# Patient Record
Sex: FEMALE | Race: WHITE | Employment: OTHER | URBAN - NONMETROPOLITAN AREA
[De-identification: names, ages, dates, MRNs, and addresses within clinical notes are randomized per-mention and may not be internally consistent; named-entity substitution may affect disease eponyms.]

---

## 2017-01-03 ENCOUNTER — PRENATAL OFFICE VISIT (OUTPATIENT)
Dept: OBGYN | Facility: OTHER | Age: 27
End: 2017-01-03
Attending: OBSTETRICS & GYNECOLOGY
Payer: COMMERCIAL

## 2017-01-03 VITALS
DIASTOLIC BLOOD PRESSURE: 71 MMHG | SYSTOLIC BLOOD PRESSURE: 106 MMHG | OXYGEN SATURATION: 98 % | HEIGHT: 64 IN | WEIGHT: 139 LBS | BODY MASS INDEX: 23.73 KG/M2 | HEART RATE: 78 BPM

## 2017-01-03 DIAGNOSIS — Z34.82 ENCOUNTER FOR SUPERVISION OF OTHER NORMAL PREGNANCY, SECOND TRIMESTER: ICD-10-CM

## 2017-01-03 DIAGNOSIS — Z34.02 SUPERVISION OF NORMAL FIRST PREGNANCY IN SECOND TRIMESTER: ICD-10-CM

## 2017-01-03 DIAGNOSIS — R00.2 PALPITATIONS: Primary | ICD-10-CM

## 2017-01-03 DIAGNOSIS — N89.8 VAGINAL DISCHARGE: ICD-10-CM

## 2017-01-03 LAB
ANION GAP SERPL CALCULATED.3IONS-SCNC: 6 MMOL/L (ref 3–14)
BUN SERPL-MCNC: 5 MG/DL (ref 7–30)
CALCIUM SERPL-MCNC: 7.9 MG/DL (ref 8.5–10.1)
CHLORIDE SERPL-SCNC: 106 MMOL/L (ref 94–109)
CO2 SERPL-SCNC: 27 MMOL/L (ref 20–32)
CREAT SERPL-MCNC: 0.47 MG/DL (ref 0.52–1.04)
ERYTHROCYTE [DISTWIDTH] IN BLOOD BY AUTOMATED COUNT: 13.6 % (ref 10–15)
GFR SERPL CREATININE-BSD FRML MDRD: ABNORMAL ML/MIN/1.7M2
GLUCOSE 1H P 50 G GLC PO SERPL-MCNC: 134 MG/DL (ref 60–129)
GLUCOSE SERPL-MCNC: 133 MG/DL (ref 70–99)
HCT VFR BLD AUTO: 32 % (ref 35–47)
HGB BLD-MCNC: 10.8 G/DL (ref 11.7–15.7)
MAGNESIUM SERPL-MCNC: 1.8 MG/DL (ref 1.6–2.3)
MCH RBC QN AUTO: 29.6 PG (ref 26.5–33)
MCHC RBC AUTO-ENTMCNC: 33.8 G/DL (ref 31.5–36.5)
MCV RBC AUTO: 88 FL (ref 78–100)
MICRO REPORT STATUS: ABNORMAL
PLATELET # BLD AUTO: 297 10E9/L (ref 150–450)
POTASSIUM SERPL-SCNC: 3.1 MMOL/L (ref 3.4–5.3)
RBC # BLD AUTO: 3.65 10E12/L (ref 3.8–5.2)
SODIUM SERPL-SCNC: 139 MMOL/L (ref 133–144)
SPECIMEN SOURCE: ABNORMAL
WBC # BLD AUTO: 8.3 10E9/L (ref 4–11)
WET PREP SPEC: ABNORMAL

## 2017-01-03 PROCEDURE — 85027 COMPLETE CBC AUTOMATED: CPT | Performed by: OBSTETRICS & GYNECOLOGY

## 2017-01-03 PROCEDURE — 36415 COLL VENOUS BLD VENIPUNCTURE: CPT | Performed by: OBSTETRICS & GYNECOLOGY

## 2017-01-03 PROCEDURE — 83735 ASSAY OF MAGNESIUM: CPT | Performed by: OBSTETRICS & GYNECOLOGY

## 2017-01-03 PROCEDURE — 87210 SMEAR WET MOUNT SALINE/INK: CPT | Performed by: OBSTETRICS & GYNECOLOGY

## 2017-01-03 PROCEDURE — 82950 GLUCOSE TEST: CPT | Performed by: OBSTETRICS & GYNECOLOGY

## 2017-01-03 PROCEDURE — 86850 RBC ANTIBODY SCREEN: CPT | Performed by: OBSTETRICS & GYNECOLOGY

## 2017-01-03 PROCEDURE — 99207 ZZC COMPLICATED OB VISIT: CPT | Performed by: OBSTETRICS & GYNECOLOGY

## 2017-01-03 PROCEDURE — 80048 BASIC METABOLIC PNL TOTAL CA: CPT | Performed by: OBSTETRICS & GYNECOLOGY

## 2017-01-03 RX ORDER — FERROUS SULFATE 325(65) MG
325 TABLET ORAL
COMMUNITY
End: 2017-03-07

## 2017-01-03 ASSESSMENT — PAIN SCALES - GENERAL: PAINLEVEL: NO PAIN (0)

## 2017-01-03 NOTE — MR AVS SNAPSHOT
"              After Visit Summary   1/3/2017    Shy Catherine    MRN: [id number]           Patient Information     Date Of Birth          1990        Visit Information        Provider Department      1/3/2017 2:30 PM Thiago Downey MD [facility]        Today's Diagnoses     Palpitations    -  1     Vaginal discharge         Supervision of normal first pregnancy in second trimester           Care Instructions    F/u 2 wks        Follow-ups after your visit        Your next 10 appointments already scheduled     Jan 09, 2017 12:30 PM   Radiology with [facility] TREAD[facility]   [facility] ([facility] )    [address] [address] [phone]            Jan 17, 2017  2:45 PM   (Arrive by 2:30 PM)   ESTABLISHED PRENATAL with Thiago Downey MD   [facility] ([facility])    [address] [place] [id number] [phone]              Who to contact     If you have questions or need follow up information about today's clinic visit or your schedule please contact [facility] directly at [phone].  Normal or non-critical lab and imaging results will be communicated to you by Innofide[employer]art, letter or phone within 4 business days after the clinic has received the results. If you do not hear from us within 7 days, please contact the clinic through Innofideihart or phone. If you have a critical or abnormal lab result, we will notify you by phone as soon as possible.  Submit refill requests through [employer] or call your pharmacy and they will forward the refill request to us. Please allow 3 business days for your refill to be completed.          Additional Information About Your Visit        Innofideihart Information     [employer] lets you send messages to your doctor, view your test results, renew your prescriptions, schedule appointments and more. To sign up, go to [email or web address] . Click on \"Log in\" on the left side of the screen, which will take " "you to the Welcome page. Then click on \"Sign up Now\" on the right side of the page.     You will be asked to enter the access code listed below, as well as some personal information. Please follow the directions to create your username and password.     Your access code is: [email or web address]  Expires: 2017  1:34 PM     Your access code will  in 90 days. If you need help or a new code, please call your [place] clinic or [phone].        Care EveryWhere ID     This is your Care EveryWhere ID. This could be used by other organizations to access your [place] medical records  [id number]        Your Vitals Were     Pulse Height BMI (Body Mass Index) Pulse Oximetry Last Period       78 5' 4\" (1.626 m) 23.85 kg/m2 98% 2016 (Exact Date)        Blood Pressure from Last 3 Encounters:   17 106/71   16 109/70   16 102/58    Weight from Last 3 Encounters:   17 139 lb (63.05 kg)   16 135 lb (61.236 kg)   16 127 lb (57.607 kg)              We Performed the Following     Basic metabolic panel  (Ca, Cl, CO2, Creat, Gluc, K, Na, BUN)     Holter Monitor 48 hour - Adult     Magnesium     Wet prep        Primary Care Provider Office Phone # Fax #    Itzel Ayala [phone]       [address] [address]        Thank you!     Thank you for choosing [facility]  for your care. Our goal is always to provide you with excellent care. Hearing back from our patients is one way we can continue to improve our services. Please take a few minutes to complete the written survey that you may receive in the mail after your visit with us. Thank you!             Your Updated Medication List - Protect others around you: Learn how to safely use, store and throw away your medicines at www.disposemymeds.org.          This list is accurate as of: 1/3/17 11:59 PM.  Always use your most recent med list.                   Brand Name Dispense " Instructions for use    butalbital-acetaminophen-caffeine -40 MG per tablet    FIORICET/ESGIC    28 tablet    Take 1 tablet by mouth every 4 hours as needed       docusate sodium 100 MG tablet    COLACE    60 tablet    Take 100 mg by mouth 2 times daily       ferrous sulfate 325 (65 FE) MG tablet    IRON     Take 325 mg by mouth daily (with breakfast)       POTASSIMIN PO      Take 10 mEq by mouth daily       PRENATAL VITAMINS PO      Take 1 tablet by mouth daily

## 2017-01-03 NOTE — NURSING NOTE
"Chief Complaint   Patient presents with     Prenatal Care     28 weeks       Initial /71 mmHg  Pulse 78  Ht 5' 4\" (1.626 m)  Wt 139 lb (63.05 kg)  BMI 23.85 kg/m2  SpO2 98%  LMP 06/21/2016 (Exact Date) Estimated body mass index is 23.85 kg/(m^2) as calculated from the following:    Height as of this encounter: 5' 4\" (1.626 m).    Weight as of this encounter: 139 lb (63.05 kg).  BP completed using cuff size: amirah Alegria      "

## 2017-01-04 LAB — BLD GP AB SCN SERPL QL: NORMAL

## 2017-01-05 ENCOUNTER — TELEPHONE (OUTPATIENT)
Dept: OBGYN | Facility: OTHER | Age: 27
End: 2017-01-05

## 2017-01-05 NOTE — PROGRESS NOTES
Increased palpitations.  Denies CP/SOB.  .  + Vag DC, irritation/itching.  Chest CTC, RRR   Wet prep done  1 hour GTT done today along with other necessary labs/electrolytes.   Cards eval.  Pt to f/u with cardiologist.  Consider Metoprolol.  Holter monitor ordered.  .  Prenatal flowsheet information is reviewed.  Discussed kick counts and fetal movement.  RTC in 2 weeks    Denies PTL scheryl, jimbo, losara Downey MD  1/5/2017

## 2017-01-05 NOTE — TELEPHONE ENCOUNTER
Pt states she took potassium at 1100 am and by 100 pm she was losing consciousness in and out, having palpitations and chest cramping also was sweating and drooling. Her parents picked her up and took her to home where she slept. States she is still having palpitations and chest cramping. Thinks she had an reaction to potassium. Please advise

## 2017-01-09 ENCOUNTER — HOSPITAL ENCOUNTER (OUTPATIENT)
Dept: NUCLEAR MEDICINE | Facility: HOSPITAL | Age: 27
Discharge: HOME | End: 2017-01-09
Attending: OBSTETRICS & GYNECOLOGY | Admitting: OBSTETRICS & GYNECOLOGY
Payer: COMMERCIAL

## 2017-01-09 PROCEDURE — 93225 XTRNL ECG REC<48 HRS REC: CPT | Mod: TC

## 2017-01-09 PROCEDURE — 93227 XTRNL ECG REC<48 HR R&I: CPT | Performed by: INTERNAL MEDICINE

## 2017-01-09 PROCEDURE — 93226 XTRNL ECG REC<48 HR SCAN A/R: CPT | Mod: TC

## 2017-01-17 ENCOUNTER — PRENATAL OFFICE VISIT (OUTPATIENT)
Dept: OBGYN | Facility: OTHER | Age: 27
End: 2017-01-17
Attending: OBSTETRICS & GYNECOLOGY
Payer: COMMERCIAL

## 2017-01-17 VITALS
SYSTOLIC BLOOD PRESSURE: 104 MMHG | HEART RATE: 85 BPM | DIASTOLIC BLOOD PRESSURE: 68 MMHG | WEIGHT: 144 LBS | OXYGEN SATURATION: 96 % | BODY MASS INDEX: 24.59 KG/M2 | HEIGHT: 64 IN

## 2017-01-17 DIAGNOSIS — R00.2 PALPITATIONS: ICD-10-CM

## 2017-01-17 DIAGNOSIS — E87.6 HYPOKALEMIA: ICD-10-CM

## 2017-01-17 DIAGNOSIS — Z34.80 SUPERVISION OF OTHER NORMAL PREGNANCY, ANTEPARTUM: ICD-10-CM

## 2017-01-17 DIAGNOSIS — D50.8 OTHER IRON DEFICIENCY ANEMIA: Primary | ICD-10-CM

## 2017-01-17 LAB
ERYTHROCYTE [DISTWIDTH] IN BLOOD BY AUTOMATED COUNT: 14.1 % (ref 10–15)
HCT VFR BLD AUTO: 34.2 % (ref 35–47)
HGB BLD-MCNC: 11.4 G/DL (ref 11.7–15.7)
MCH RBC QN AUTO: 29.3 PG (ref 26.5–33)
MCHC RBC AUTO-ENTMCNC: 33.3 G/DL (ref 31.5–36.5)
MCV RBC AUTO: 88 FL (ref 78–100)
PLATELET # BLD AUTO: 274 10E9/L (ref 150–450)
POTASSIUM SERPL-SCNC: 3.7 MMOL/L (ref 3.4–5.3)
RBC # BLD AUTO: 3.89 10E12/L (ref 3.8–5.2)
WBC # BLD AUTO: 8.2 10E9/L (ref 4–11)

## 2017-01-17 PROCEDURE — 99207 ZZC PRENATAL VISIT: CPT | Performed by: OBSTETRICS & GYNECOLOGY

## 2017-01-17 PROCEDURE — 99000 SPECIMEN HANDLING OFFICE-LAB: CPT | Performed by: OBSTETRICS & GYNECOLOGY

## 2017-01-17 PROCEDURE — 84132 ASSAY OF SERUM POTASSIUM: CPT | Performed by: OBSTETRICS & GYNECOLOGY

## 2017-01-17 PROCEDURE — 85027 COMPLETE CBC AUTOMATED: CPT | Performed by: OBSTETRICS & GYNECOLOGY

## 2017-01-17 PROCEDURE — 82330 ASSAY OF CALCIUM: CPT | Mod: 90 | Performed by: OBSTETRICS & GYNECOLOGY

## 2017-01-17 PROCEDURE — 36415 COLL VENOUS BLD VENIPUNCTURE: CPT | Performed by: OBSTETRICS & GYNECOLOGY

## 2017-01-17 ASSESSMENT — PAIN SCALES - GENERAL: PAINLEVEL: NO PAIN (0)

## 2017-01-17 NOTE — NURSING NOTE
"Chief Complaint   Patient presents with     Prenatal Care     30 weeks       Initial /68 mmHg  Pulse 85  Ht 5' 4\" (1.626 m)  Wt 144 lb (65.318 kg)  BMI 24.71 kg/m2  SpO2 96%  LMP 06/21/2016 (Exact Date) Estimated body mass index is 24.71 kg/(m^2) as calculated from the following:    Height as of this encounter: 5' 4\" (1.626 m).    Weight as of this encounter: 144 lb (65.318 kg).  BP completed using cuff size: amirah Alegria      "

## 2017-01-17 NOTE — MR AVS SNAPSHOT
"              After Visit Summary   1/17/2017    Shy Catherine    MRN: [id number]           Patient Information     Date Of Birth          1990        Visit Information        Provider Department      1/17/2017 2:45 PM Thiago Downey MD [facility]        Today's Diagnoses     Anemia    -  1     Palpitations         Hypokalemia         Supervision of other normal pregnancy, antepartum           Care Instructions    RTC in 2 weeks        Follow-ups after your visit        Your next 10 appointments already scheduled     Jan 31, 2017  2:30 PM   (Arrive by 2:15 PM)   ESTABLISHED PRENATAL with Thigao Downey MD   [facility] ([facility])    [address] [address]   [phone]              Who to contact     If you have questions or need follow up information about today's clinic visit or your schedule [facility] at [phone].  Normal or non-critical lab and imaging results will be communicated to you by MyChart, letter or phone within 4 business days after the clinic has received the results. If you do not hear from us within 7 days, please contact the clinic through MyChart or phone. If you have a critical or abnormal lab result, we will notify you by phone as soon as possible.  Submit refill requests through [employer] or call your pharmacy and they will forward the refill request to us. Please allow 3 business days for your refill to be completed.          Additional Information About Your Visit        MyChart Information     [employer] lets you send messages to your doctor, view your test results, renew your prescriptions, schedule appointments and more. To sign up, go to [email or web address] . Click on \"Log in\" on the left side of the screen, which will take you to the Welcome page. Then click on \"Sign up Now\" on the right side of the page.     You will be asked to enter the access code listed below, as well as some " "personal information. Please follow the directions to create your username and password.     Your access code is: [email or web address]  Expires: 2017  1:34 PM     Your access code will  in 90 days. If you need help or a new code, please call your [place] clinic or [phone].        Care EveryWhere ID     This is your Care EveryWhere ID. This could be used by other organizations to access your [place] medical records  [id number]        Your Vitals Were     Pulse Height BMI (Body Mass Index) Pulse Oximetry Last Period       85 5' 4\" (1.626 m) 24.71 kg/m2 96% 2016 (Exact Date)        Blood Pressure from Last 3 Encounters:   17 104/68   17 106/71   16 109/70    Weight from Last 3 Encounters:   17 144 lb (65.318 kg)   17 139 lb (63.05 kg)   16 135 lb (61.236 kg)              We Performed the Following     Calcium ionized     CBC with platelets     Potassium        Primary Care Provider Office Phone # Fax #    Itzel VizcarraMAYA estrada [phone]       [address] [address]        Thank you!     Thank you for choosing [facility]  for your care. Our goal is always to provide you with excellent care. Hearing back from our patients is one way we can continue to improve our services. Please take a few minutes to complete the written survey that you may receive in the mail after your visit with us. Thank you!             Your Updated Medication List - Protect others around you: Learn how to safely use, store and throw away your medicines at www.disposemymeds.org.          This list is accurate as of: 17  3:26 PM.  Always use your most recent med list.                   Brand Name Dispense Instructions for use    butalbital-acetaminophen-caffeine -40 MG per tablet    FIORICET/ESGIC    28 tablet    Take 1 tablet by mouth every 4 hours as needed       docusate sodium 100 MG tablet    COLACE    60 tablet    Take 100 mg by " mouth 2 times daily       ferrous sulfate 325 (65 FE) MG tablet    IRON     Take 325 mg by mouth daily (with breakfast)       METAMUCIL PO      Take 1 tablet by mouth daily       PRENATAL VITAMINS PO      Take 1 tablet by mouth daily

## 2017-01-17 NOTE — PROGRESS NOTES
Holter monitor reviewed.  PAC/PV's.  Cardiology f/u scheduled 1/30  Had reaction to potassium so eating potassium rich foods.  On iron.  Doing well.  No concerns today.  Occ BX 2/hr.  PTL precautions discussed.   RTC in 2 weeks  Denies PTL lori, jimbo, ashley Downey MD  1/17/2017

## 2017-01-18 LAB — CA-I SERPL ISE-MCNC: 4.8 MG/DL (ref 4.4–5.2)

## 2017-01-19 ENCOUNTER — TRANSFERRED RECORDS (OUTPATIENT)
Dept: HEALTH INFORMATION MANAGEMENT | Facility: HOSPITAL | Age: 27
End: 2017-01-19

## 2017-01-30 ENCOUNTER — HOSPITAL ENCOUNTER (OUTPATIENT)
Facility: HOSPITAL | Age: 27
Discharge: HOME | End: 2017-01-30
Attending: OBSTETRICS & GYNECOLOGY | Admitting: OBSTETRICS & GYNECOLOGY
Payer: COMMERCIAL

## 2017-01-30 ENCOUNTER — TELEPHONE (OUTPATIENT)
Dept: OBGYN | Facility: HOSPITAL | Age: 27
End: 2017-01-30

## 2017-01-30 VITALS
OXYGEN SATURATION: 97 % | RESPIRATION RATE: 16 BRPM | HEART RATE: 85 BPM | DIASTOLIC BLOOD PRESSURE: 66 MMHG | SYSTOLIC BLOOD PRESSURE: 104 MMHG

## 2017-01-30 PROBLEM — Z36.89 ENCOUNTER FOR TRIAGE IN PREGNANT PATIENT: Status: ACTIVE | Noted: 2017-01-30

## 2017-01-30 LAB
ALBUMIN UR-MCNC: NEGATIVE MG/DL
APPEARANCE UR: CLEAR
BILIRUB UR QL STRIP: NEGATIVE
COLOR UR AUTO: ABNORMAL
FIBRONECTIN FETAL VAG QL: NORMAL
GLUCOSE UR STRIP-MCNC: NEGATIVE MG/DL
HGB UR QL STRIP: NEGATIVE
KETONES UR STRIP-MCNC: NEGATIVE MG/DL
LEUKOCYTE ESTERASE UR QL STRIP: ABNORMAL
MUCOUS THREADS #/AREA URNS LPF: PRESENT /LPF
NITRATE UR QL: NEGATIVE
PH UR STRIP: 6 PH (ref 4.7–8)
RBC #/AREA URNS AUTO: 3 /HPF (ref 0–2)
SP GR UR STRIP: 1 (ref 1–1.03)
SQUAMOUS #/AREA URNS AUTO: 1 /HPF (ref 0–1)
URN SPEC COLLECT METH UR: ABNORMAL
UROBILINOGEN UR STRIP-MCNC: NORMAL MG/DL (ref 0–2)
WBC #/AREA URNS AUTO: 1 /HPF (ref 0–2)

## 2017-01-30 PROCEDURE — 99213 OFFICE O/P EST LOW 20 MIN: CPT | Mod: 25 | Performed by: OBSTETRICS & GYNECOLOGY

## 2017-01-30 PROCEDURE — 81001 URINALYSIS AUTO W/SCOPE: CPT | Performed by: OBSTETRICS & GYNECOLOGY

## 2017-01-30 PROCEDURE — 59025 FETAL NON-STRESS TEST: CPT | Mod: 26 | Performed by: OBSTETRICS & GYNECOLOGY

## 2017-01-30 PROCEDURE — 99214 OFFICE O/P EST MOD 30 MIN: CPT | Mod: 25

## 2017-01-30 PROCEDURE — 87086 URINE CULTURE/COLONY COUNT: CPT | Performed by: OBSTETRICS & GYNECOLOGY

## 2017-01-30 PROCEDURE — 82731 ASSAY OF FETAL FIBRONECTIN: CPT | Performed by: OBSTETRICS & GYNECOLOGY

## 2017-01-30 PROCEDURE — 59025 FETAL NON-STRESS TEST: CPT

## 2017-01-30 NOTE — PLAN OF CARE
OB Triage Note  Shy Catherine  MRN: [id number]  Gestational Age: 31w6d      Shy Catherine presents for decreases fetal movement , vaginal discharge ,thick mucous, and had red bleeding this am (sign/symptom/concern).      States kaylee since this am .  Rates pain at 4/10.  Bleeding: spotting  began at 8:00.  Denies LOF.      MDnotified of arrival and condition.  Oriented patient to surroundings. Call light within reach.     FHT:140  NST Start Time: 1100  NST Stop Time:  1120  NST: Reactive   Uterine Assessment:   no UC's palpated or noted on strip    Plan:  -Initial NST, then fetal/uterine monitoring per MD/patient plan.  -Sterile vaginal exam/sterile speculum exam.  -Nursing education on  labor provided.  UA normal and FFN is pending

## 2017-01-30 NOTE — PLAN OF CARE
Dr Adair in with patient , VE thick , fingertip and soft cervix , discussed  labor , plan to see what FFN results are and then discharge

## 2017-01-30 NOTE — IP AVS SNAPSHOT
MRN:[id number]                      After Visit Summary   1/30/2017    Shy Catherine    MRN: [id number]           Thank you!     Thank you for choosing [facility] for your care. Our goal is always to provide you with excellent care. Hearing back from our patients is one way we can continue to improve our services. Please take a few minutes to complete the written survey that you may receive in the mail after you visit with us. Thank you!        Patient Information     Date Of Birth          1990        About your hospital stay     You were admitted on:  January 30, 2017 You last received care in the:  [facility]    You were discharged on:  January 30, 2017       Who to Call     For medical emergencies, please call 911.  For non-urgent questions about your medical care, please call your primary care provider or clinic, [phone]          Attending Provider     Provider    Aida Adair MD       Primary Care Provider Office Phone # Fax #    Itzel AyalaMAYA [phone]       [address] [address]        Your next 10 appointments already scheduled     Jan 31, 2017  2:30 PM   (Arrive by 2:15 PM)   ESTABLISHED PRENATAL with Thiago Downey MD   [facility] ([facility])    [address] [place] [id number] [phone]              Further instructions from your care team       Discharge Instructions for Undelivered Patients    Diet:  * Drink 8 to 12 glasses of liquids (milk, juice, water) every day  * You may eat meals and snacks.    Activity:  * Count fetal kicks every day.  * Call your doctor if your baby is moving less than usual.    Call your provider if you notice:  * Swelling in your face or increased swelling in your hands or legs.  * Headaches that are not relieved by Tylenol (acetaminophen).  * Changes in your vision (blurring; seeing spots or stars).  * Nausea (sick to your stomach) and vomiting  "(throwing up).  * Weight gain of 5 pounds per week.  * Heartburn that doesn't go away.  * Signs of bladder infection: Pain when you urinate (use the toilet), needing to go more often or more urgently.  * The bag of coronel (membrane) breaks, or you notice leaking in your underwear.  * Bright red blood in your underwear.  * Abdominal (lower belly) or stomach pain.    * Increase or change in vaginal discharge (note the color and amount).  Call or come to [facility] with greater that 4-6 contractions an hour , tightening and letting go and painful         [facility]: [phone]        Pending Results     Date and Time Order Name Status Description    2017 1127 Urine Culture Aerobic Bacterial In process     2017 1111 Fetal fibronectin In process             Admission Information        Provider Department Dept Phone    2017 Aida Adair MD, MD [facility] [phone]      Your Vitals Were     Blood Pressure Pulse Respirations Pulse Oximetry Last Period       104/66 mmHg 85 16 97% 2016 (Exact Date)       Field[employer] Information     [employer] lets you send messages to your doctor, view your test results, renew your prescriptions, schedule appointments and more. To sign up, go to [email or web address] . Click on \"Log in\" on the left side of the screen, which will take you to the Welcome page. Then click on \"Sign up Now\" on the right side of the page.     You will be asked to enter the access code listed below, as well as some personal information. Please follow the directions to create your username and password.     Your access code is: [email or web address]  Expires: 2017  1:34 PM     Your access code will  in 90 days. If you need help or a new code, please call your [place] clinic or [phone].        Care EveryWhere ID     This is your Care EveryWhere ID. This could be used by other organizations to access your [place] medical records  [id number]           Review " of your medicines      UNREVIEWED medicines. Ask your doctor about these medicines        Dose / Directions    butalbital-acetaminophen-caffeine -40 MG per tablet   Commonly known as:  FIORICET/ESGIC   Used for:  Migraine without aura and without status migrainosus, not intractable        Dose:  1 tablet   Take 1 tablet by mouth every 4 hours as needed   Quantity:  28 tablet   Refills:  0       docusate sodium 100 MG tablet   Commonly known as:  COLACE   Used for:  Slow transit constipation        Dose:  100 mg   Take 100 mg by mouth 2 times daily   Quantity:  60 tablet   Refills:  1       ferrous sulfate 325 (65 FE) MG tablet   Commonly known as:  IRON        Dose:  325 mg   Take 325 mg by mouth daily (with breakfast)   Refills:  0       METAMUCIL PO        Dose:  1 tablet   Take 1 tablet by mouth daily   Refills:  0       PRENATAL VITAMINS PO        Dose:  1 tablet   Take 1 tablet by mouth daily   Refills:  0                Protect others around you: Learn how to safely use, store and throw away your medicines at www.disposemymeds.org.             Medication List: This is a list of all your medications and when to take them. Check marks below indicate your daily home schedule. Keep this list as a reference.      Medications           Morning Afternoon Evening Bedtime As Needed    butalbital-acetaminophen-caffeine -40 MG per tablet   Commonly known as:  FIORICET/ESGIC   Take 1 tablet by mouth every 4 hours as needed                                docusate sodium 100 MG tablet   Commonly known as:  COLACE   Take 100 mg by mouth 2 times daily                                ferrous sulfate 325 (65 FE) MG tablet   Commonly known as:  IRON   Take 325 mg by mouth daily (with breakfast)                                METAMUCIL PO   Take 1 tablet by mouth daily                                PRENATAL VITAMINS PO   Take 1 tablet by mouth daily

## 2017-01-30 NOTE — IP AVS SNAPSHOT
[facility]    [address] [address]    Phone:  [phone]    Fax:  [phone]                                       After Visit Summary   1/30/2017    Shy Catherine    MRN: [id number]           After Visit Summary Signature Page     I have received my discharge instructions, and my questions have been answered. I have discussed any challenges I see with this plan with the nurse or doctor.    ..........................................................................................................................................  Patient/Patient Representative Signature      ..........................................................................................................................................  Patient Representative Print Name and Relationship to Patient    ..................................................               ................................................  Date                                            Time    ..........................................................................................................................................  Reviewed by Signature/Title    ...................................................              ..............................................  Date                                                            Time

## 2017-01-30 NOTE — PROGRESS NOTES
"FFN neg  NST reactive  cx  Very soft/long/poss FT  Term delivery and \"shot\"  She was given discussed  Labor precautions given  Home with instructions to keep usual appt.  15 minutes spent face to face counseling in addition to exam  "

## 2017-01-30 NOTE — PLAN OF CARE
Shy Catherine        NST:  reactive  Start: 1100  Stop :1120    Physician: Dr Adair for Dr Downey  Reason For Test:  Labor  EDC:3/28/17  Gestational Age: 31w5d    Comments:  UA sent and FFN awaiting results      Cristina Parsons

## 2017-01-30 NOTE — DISCHARGE INSTRUCTIONS
Discharge Instructions for Undelivered Patients    Diet:  * Drink 8 to 12 glasses of liquids (milk, juice, water) every day  * You may eat meals and snacks.    Activity:  * Count fetal kicks every day.  * Call your doctor if your baby is moving less than usual.    Call your provider if you notice:  * Swelling in your face or increased swelling in your hands or legs.  * Headaches that are not relieved by Tylenol (acetaminophen).  * Changes in your vision (blurring; seeing spots or stars).  * Nausea (sick to your stomach) and vomiting (throwing up).  * Weight gain of 5 pounds per week.  * Heartburn that doesn't go away.  * Signs of bladder infection: Pain when you urinate (use the toilet), needing to go more often or more urgently.  * The bag of coronel (membrane) breaks, or you notice leaking in your underwear.  * Bright red blood in your underwear.  * Abdominal (lower belly) or stomach pain.    * Increase or change in vaginal discharge (note the color and amount).  Call or come to [facility] with greater that 4-6 contractions an hour , tightening and letting go and painful         [facility]: [phone]

## 2017-01-30 NOTE — TELEPHONE ENCOUNTER
Obstetric Phone Triage- [facility]    **REMEMBER: Review patient's prenatal record including complications with pregnancy and medications patient may be on (insulins, tocolytic, etc.)**      2017 10:33 AM  Shy Catherine 1990   Home Phone [phone]   Mobile [phone]                       Last office visit/Cervix exam:    EDC 3/28/17 Gestational Age 31 6/7 weeks    Spoke with patient.  Patient [place] 45 [place] away    Reason for call per pt: Vaginal bleeding quarter sized amt. with mucus while using the bathroom this am. Low abdominal cramping since last night. Hasn't felt baby move since last night.     Is your provider aware of this concern? no    Did you have any complications with this or a previous pregnancy? (Pre-term labor, C/S, Previa, pre-eclampsia, diabetes) subchorionic hemorrhage that resolved at 14 weeks, WPW syndrome, low iron and potassium at last appt.     Are you having contractions? Yes   -If yes: Contractions irregular    When did they start? Last night    Have you palpated your contractions? No    Describe contraction discomfort (ex: having to breath through contractions, stop what you are doing, etc.): low abdominal ones - period like cramping    Vaginal/rectal pressure: no    What types of activity have you done in the past 24 hours? normal    Have you had intercourse/anything in the vagina in the last 48 hours?No    Are you being treated for any vaginal infections? no    How much fluids have you been drinking? 2 glasses    Are you having any bloody show/Bleeding? Yes   -If yes: Amount: quarter sized    Color: bright red    [place]/anything vaginally in the last 24-48 hours? No    When did it start? This am    Pain since bleeding started? yes    Are you having any new vaginal discharge or are you leaking fluids/has your water broken? yes   -If yes: Amount:half dollar size    Color: mucus with blood    Odor: no    Time: this am    Are you being treated for any vaginal  infection? no    Is your baby moving normally? No   -If no: Have you monitored your baby's movements? yes    When did you notice this decrease in baby movement? Last night      Additional Concerns (abd pain, headache, swelling, visual changes, etc):no    Patient Advised: Advised pt to comr into the nearest hospital to be evaluated as soon as possible.     Call taken by: Leigh Cutler RN    Call back time:         Information:

## 2017-01-31 ENCOUNTER — PRENATAL OFFICE VISIT (OUTPATIENT)
Dept: OBGYN | Facility: OTHER | Age: 27
End: 2017-01-31
Attending: OBSTETRICS & GYNECOLOGY
Payer: COMMERCIAL

## 2017-01-31 VITALS
WEIGHT: 146 LBS | BODY MASS INDEX: 24.92 KG/M2 | HEIGHT: 64 IN | HEART RATE: 91 BPM | SYSTOLIC BLOOD PRESSURE: 110 MMHG | DIASTOLIC BLOOD PRESSURE: 69 MMHG | OXYGEN SATURATION: 98 %

## 2017-01-31 DIAGNOSIS — O46.93 THIRD TRIMESTER BLEEDING: Primary | ICD-10-CM

## 2017-01-31 DIAGNOSIS — Z34.80 SUPERVISION OF OTHER NORMAL PREGNANCY, ANTEPARTUM: ICD-10-CM

## 2017-01-31 PROCEDURE — 99207 ZZC PRENATAL VISIT: CPT | Performed by: OBSTETRICS & GYNECOLOGY

## 2017-01-31 ASSESSMENT — PAIN SCALES - GENERAL: PAINLEVEL: NO PAIN (0)

## 2017-01-31 NOTE — NURSING NOTE
"Chief Complaint   Patient presents with     Prenatal Care     32 weeks       Initial /69 mmHg  Pulse 91  Ht 5' 4\" (1.626 m)  Wt 146 lb (66.225 kg)  BMI 25.05 kg/m2  SpO2 98%  LMP 06/21/2016 (Exact Date) Estimated body mass index is 25.05 kg/(m^2) as calculated from the following:    Height as of this encounter: 5' 4\" (1.626 m).    Weight as of this encounter: 146 lb (66.225 kg).  BP completed using cuff size: amirah Alegria      "

## 2017-01-31 NOTE — PROGRESS NOTES
Doing well. Palpitations resolved.   Was in L and D yesterday with spotting x 1/BH had neg FFN.  No further ct/s or bleeding since.  UA neg.  Discussed PTL, PROM, and when to call or come in.  Discussed kick counts and fetal movement.  RTC in 2 weeks.  US growth ordered.  Denies PTL sx, vb, ashley Downey MD  1/31/2017

## 2017-02-01 ENCOUNTER — HOSPITAL ENCOUNTER (OUTPATIENT)
Dept: ULTRASOUND IMAGING | Facility: HOSPITAL | Age: 27
Discharge: HOME | End: 2017-02-01
Attending: OBSTETRICS & GYNECOLOGY | Admitting: OBSTETRICS & GYNECOLOGY
Payer: COMMERCIAL

## 2017-02-01 LAB
BACTERIA SPEC CULT: NO GROWTH
MICRO REPORT STATUS: NORMAL
SPECIMEN SOURCE: NORMAL

## 2017-02-01 PROCEDURE — 76816 OB US FOLLOW-UP PER FETUS: CPT | Mod: TC

## 2017-02-14 ENCOUNTER — PRENATAL OFFICE VISIT (OUTPATIENT)
Dept: OBGYN | Facility: OTHER | Age: 27
End: 2017-02-14
Attending: OBSTETRICS & GYNECOLOGY
Payer: COMMERCIAL

## 2017-02-14 VITALS
HEIGHT: 64 IN | SYSTOLIC BLOOD PRESSURE: 105 MMHG | DIASTOLIC BLOOD PRESSURE: 68 MMHG | WEIGHT: 150 LBS | BODY MASS INDEX: 25.61 KG/M2 | OXYGEN SATURATION: 95 % | HEART RATE: 76 BPM

## 2017-02-14 DIAGNOSIS — O09.93 PREGNANCY, SUPERVISION OF, HIGH-RISK, THIRD TRIMESTER: Primary | ICD-10-CM

## 2017-02-14 PROCEDURE — 99207 ZZC PRENATAL VISIT: CPT | Performed by: OBSTETRICS & GYNECOLOGY

## 2017-02-14 ASSESSMENT — PAIN SCALES - GENERAL: PAINLEVEL: NO PAIN (0)

## 2017-02-14 NOTE — MR AVS SNAPSHOT
"              After Visit Summary   2/14/2017    Shy Catherine    MRN: [id number]           Patient Information     Date Of Birth          1990        Visit Information        Provider Department      2/14/2017 2:45 PM Thiago Downey MD [facility]        Today's Diagnoses     Pregnancy, supervision of, high-risk, third trimester    -  1      Care Instructions    RTC in 2 weeks        Follow-ups after your visit        Your next 10 appointments already scheduled     Feb 28, 2017  1:45 PM CST   (Arrive by 1:30 PM)   ESTABLISHED PRENATAL with MD [facility] ([facility] [place] [facility])    [address]  [place] [address]   [phone]              Who to contact     If you have questions or need follow up information about today's clinic visit or your schedule please contact [facility] directly at [phone].  Normal or non-critical lab and imaging results will be communicated to you by MyChart, letter or phone within 4 business days after the clinic has received the results. If you do not hear from us within 7 days, please contact the clinic through MyChart or phone. If you have a critical or abnormal lab result, we will notify you by phone as soon as possible.  Submit refill requests through [employer] or call your pharmacy and they will forward the refill request to us. Please allow 3 business days for your refill to be completed.          Additional Information About Your Visit        MyChart Information     [employer] lets you send messages to your doctor, view your test results, renew your prescriptions, schedule appointments and more. To sign up, go to [email or web address] . Click on \"Log in\" on the left side of the screen, which will take you to the Welcome page. Then click on \"Sign up Now\" on the right side of the page.     You will be asked to enter the access code listed below, as well as some personal information. Please follow the directions to " "create your username and password.     Your access code is: [email or web address]  Expires: 2017 12:17 PM     Your access code will  in 90 days. If you need help or a new code, please call your [facility] or [phone].        Care EveryWhere ID     This is your Care EveryWhere ID. This could be used by other organizations to access your [place] medical records  [id number]        Your Vitals Were     Pulse Height Last Period Pulse Oximetry BMI (Body Mass Index)       76 5' 4\" (1.626 m) 2016 (Exact Date) 95% 25.75 kg/m2        Blood Pressure from Last 3 Encounters:   17 105/68   17 110/69   17 104/66    Weight from Last 3 Encounters:   17 150 lb (68 kg)   17 146 lb (66.2 kg)   17 144 lb (65.3 kg)              Today, you had the following     No orders found for display       Primary Care Provider Office Phone # Fax #    Itzel MAYA Ayala [phone]       [address] [address]        Thank you!     Thank you for choosing [facility]  for your care. Our goal is always to provide you with excellent care. Hearing back from our patients is one way we can continue to improve our services. Please take a few minutes to complete the written survey that you may receive in the mail after your visit with us. Thank you!             Your Updated Medication List - Protect others around you: Learn how to safely use, store and throw away your medicines at www.disposemymeds.org.          This list is accurate as of: 17 11:59 PM.  Always use your most recent med list.                   Brand Name Dispense Instructions for use    ferrous sulfate 325 (65 FE) MG tablet    IRON     Take 325 mg by mouth daily (with breakfast) Reported on 2017       METAMUCIL PO      Take 1 tablet by mouth daily       PRENATAL VITAMINS PO      Take 1 tablet by mouth daily         "

## 2017-02-14 NOTE — NURSING NOTE
"Chief Complaint   Patient presents with     Prenatal Care     34 weeks       Initial /68 (BP Location: Left arm, Cuff Size: Adult Regular)  Pulse 76  Ht 5' 4\" (1.626 m)  Wt 150 lb (68 kg)  LMP 06/21/2016 (Exact Date)  SpO2 95%  BMI 25.75 kg/m2 Estimated body mass index is 25.75 kg/(m^2) as calculated from the following:    Height as of this encounter: 5' 4\" (1.626 m).    Weight as of this encounter: 150 lb (68 kg).  Medication Reconciliation: complete     Chrissy Alegria      "

## 2017-02-15 NOTE — PROGRESS NOTES
Doing well.  No concerns today.  Prenatal flowsheet information is reviewed.  Discussed kick counts and fetal movement.  RTC in 2 weeks  Denies PTL lori, jimbo, ashley Downey MD  2/15/2017

## 2017-02-28 ENCOUNTER — PRENATAL OFFICE VISIT (OUTPATIENT)
Dept: OBGYN | Facility: OTHER | Age: 27
End: 2017-02-28
Attending: OBSTETRICS & GYNECOLOGY
Payer: COMMERCIAL

## 2017-02-28 VITALS
WEIGHT: 154 LBS | DIASTOLIC BLOOD PRESSURE: 64 MMHG | BODY MASS INDEX: 26.29 KG/M2 | HEART RATE: 66 BPM | OXYGEN SATURATION: 98 % | HEIGHT: 64 IN | SYSTOLIC BLOOD PRESSURE: 110 MMHG

## 2017-02-28 DIAGNOSIS — Z34.80 SUPERVISION OF OTHER NORMAL PREGNANCY, ANTEPARTUM: Primary | ICD-10-CM

## 2017-02-28 DIAGNOSIS — N93.9 VAGINAL SPOTTING: ICD-10-CM

## 2017-02-28 DIAGNOSIS — O26.843 UTERINE SIZE DATE DISCREPANCY PREGNANCY, THIRD TRIMESTER: ICD-10-CM

## 2017-02-28 PROCEDURE — 87653 STREP B DNA AMP PROBE: CPT | Performed by: OBSTETRICS & GYNECOLOGY

## 2017-02-28 PROCEDURE — 99207 ZZC PRENATAL VISIT: CPT | Performed by: OBSTETRICS & GYNECOLOGY

## 2017-02-28 ASSESSMENT — PAIN SCALES - GENERAL: PAINLEVEL: NO PAIN (0)

## 2017-02-28 NOTE — NURSING NOTE
"Chief Complaint   Patient presents with     Prenatal Care     36 weeks       Initial /64 (BP Location: Left arm, Cuff Size: Adult Regular)  Pulse 66  Ht 5' 4\" (1.626 m)  Wt 154 lb (69.9 kg)  LMP 06/21/2016 (Exact Date)  SpO2 98%  BMI 26.43 kg/m2 Estimated body mass index is 26.43 kg/(m^2) as calculated from the following:    Height as of this encounter: 5' 4\" (1.626 m).    Weight as of this encounter: 154 lb (69.9 kg).  Medication Reconciliation: complete     Chrissy Alegria      "

## 2017-02-28 NOTE — MR AVS SNAPSHOT
"              After Visit Summary   2/28/2017    Shy Catherine    MRN: [id number]           Patient Information     Date Of Birth          1990        Visit Information        Provider Department      2/28/2017 1:45 PM Thiago Downey MD [facility]        Today's Diagnoses     Supervision of other normal pregnancy, antepartum    -  1    Vaginal spotting        Uterine size date discrepancy pregnancy, third trimester          Care Instructions    F/u 1 wks        Follow-ups after your visit        Your next 10 appointments already scheduled     Mar 07, 2017  3:45 PM CST   (Arrive by 3:30 PM)   ESTABLISHED PRENATAL with Thiago Downey MD   [facility] ([facility])    [address]  [facility] [address]   [phone]              Who to contact     If you have questions or need follow up information about today's clinic visit or your schedule please contact [facility] directly at [phone].  Normal or non-critical lab and imaging results will be communicated to you by MyChart, letter or phone within 4 business days after the clinic has received the results. If you do not hear from us within 7 days, please contact the clinic through [employer] or phone. If you have a critical or abnormal lab result, we will notify you by phone as soon as possible.  Submit refill requests through [employer] or call your pharmacy and they will forward the refill request to us. Please allow 3 business days for your refill to be completed.          Additional Information About Your Visit        MyChart Information     [employer] lets you send messages to your doctor, view your test results, renew your prescriptions, schedule appointments and more. To sign up, go to [email or web address] . Click on \"Log in\" on the left side of the screen, which will take you to the Welcome page. Then click on \"Sign up Now\" on the right side of the page.     You will be asked to enter the access code " "listed below, as well as some personal information. Please follow the directions to create your username and password.     Your access code is: [email or web address]  Expires: 2017 12:17 PM     Your access code will  in 90 days. If you need help or a new code, please call your [facility] or [phone].        Care EveryWhere ID     This is your Care EveryWhere ID. This could be used by other organizations to access your [place] medical records  [id number]        Your Vitals Were     Pulse Height Last Period Pulse Oximetry BMI (Body Mass Index)       66 5' 4\" (1.626 m) 2016 (Exact Date) 98% 26.43 kg/m2        Blood Pressure from Last 3 Encounters:   17 110/64   17 105/68   17 110/69    Weight from Last 3 Encounters:   17 154 lb (69.9 kg)   17 150 lb (68 kg)   17 146 lb (66.2 kg)              We Performed the Following     Group B strep PCR     US OB Single Follow Up Repeat        Primary Care Provider Office Phone # Fax #    Itzel HerreraMAYA barnes [phone]       [address] [address]        Thank you!     Thank you for choosing [facility]  for your care. Our goal is always to provide you with excellent care. Hearing back from our patients is one way we can continue to improve our services. Please take a few minutes to complete the written survey that you may receive in the mail after your visit with us. Thank you!             Your Updated Medication List - Protect others around you: Learn how to safely use, store and throw away your medicines at www.disposemymeds.org.          This list is accurate as of: 17  2:19 PM.  Always use your most recent med list.                   Brand Name Dispense Instructions for use    ferrous sulfate 325 (65 FE) MG tablet    IRON     Take 325 mg by mouth daily (with breakfast) Reported on 2017       METAMUCIL PO      Take 1 tablet by mouth daily       PRENATAL VITAMINS " PO      Take 1 tablet by mouth daily

## 2017-02-28 NOTE — PROGRESS NOTES
Doing well.  No concerns today.  Some ctx and 1 episode spotting on Saturday.  None since.    GBS done today. Will discuss results at next visit.  RTC in 1 weeks  US growth ordered.  Bleeding/labor precautions discussed.  Thigao Downey MD  2/28/2017

## 2017-03-01 ENCOUNTER — HOSPITAL ENCOUNTER (OUTPATIENT)
Dept: ULTRASOUND IMAGING | Facility: HOSPITAL | Age: 27
Discharge: HOME | End: 2017-03-01
Attending: OBSTETRICS & GYNECOLOGY | Admitting: OBSTETRICS & GYNECOLOGY
Payer: COMMERCIAL

## 2017-03-01 LAB
GP B STREP DNA SPEC QL NAA+PROBE: NORMAL
SPECIMEN SOURCE: NORMAL

## 2017-03-01 PROCEDURE — 76816 OB US FOLLOW-UP PER FETUS: CPT | Mod: TC

## 2017-03-07 ENCOUNTER — PRENATAL OFFICE VISIT (OUTPATIENT)
Dept: OBGYN | Facility: OTHER | Age: 27
End: 2017-03-07
Attending: OBSTETRICS & GYNECOLOGY
Payer: COMMERCIAL

## 2017-03-07 VITALS
WEIGHT: 156 LBS | SYSTOLIC BLOOD PRESSURE: 116 MMHG | HEART RATE: 67 BPM | OXYGEN SATURATION: 98 % | BODY MASS INDEX: 26.63 KG/M2 | HEIGHT: 64 IN | DIASTOLIC BLOOD PRESSURE: 71 MMHG

## 2017-03-07 DIAGNOSIS — Z34.80 SUPERVISION OF OTHER NORMAL PREGNANCY, ANTEPARTUM: Primary | ICD-10-CM

## 2017-03-07 PROCEDURE — 99207 ZZC PRENATAL VISIT: CPT | Performed by: OBSTETRICS & GYNECOLOGY

## 2017-03-07 ASSESSMENT — PAIN SCALES - GENERAL: PAINLEVEL: NO PAIN (0)

## 2017-03-07 NOTE — NURSING NOTE
"Chief Complaint   Patient presents with     Prenatal Care     37 weeks       Initial /71 (BP Location: Left arm, Cuff Size: Adult Regular)  Pulse 67  Ht 5' 4\" (1.626 m)  Wt 156 lb (70.8 kg)  LMP 06/21/2016 (Exact Date)  SpO2 98%  BMI 26.78 kg/m2 Estimated body mass index is 26.78 kg/(m^2) as calculated from the following:    Height as of this encounter: 5' 4\" (1.626 m).    Weight as of this encounter: 156 lb (70.8 kg).  Medication Reconciliation: complete     Chrissy Alegria      "

## 2017-03-07 NOTE — PROGRESS NOTES
Doing well.  No concerns today.  US reviewed/nml.  PC spotting x 1.  Labor precautions  RTC in 1 week  Denies regular contractions, vaginal bleeding, LOF  Thiago Downey MD  3/7/2017

## 2017-03-07 NOTE — MR AVS SNAPSHOT
"              After Visit Summary   3/7/2017    Shy Catherine    MRN: [id number]           Patient Information     Date Of Birth          1990        Visit Information        Provider Department      3/7/2017 3:45 PM Thiago Downey MD [facility]        Today's Diagnoses     Supervision of other normal pregnancy, antepartum    -  1      Care Instructions    F/u 1 wk        Follow-ups after your visit        Your next 10 appointments already scheduled     Mar 14, 2017  4:00 PM CDT   (Arrive by 3:45 PM)   ESTABLISHED PRENATAL with Thiago Downey MD   [facility] ([facility])    [address] [place] [address]   [phone]              Who to contact     If you have questions or need follow up information about today's clinic visit or your schedule please contact [facility] directly at [phone].  Normal or non-critical lab and imaging results will be communicated to you by MyChart, letter or phone within 4 business days after the clinic has received the results. If you do not hear from us within 7 days, please contact the clinic through MyChart or phone. If you have a critical or abnormal lab result, we will notify you by phone as soon as possible.  Submit refill requests through [employer] or call your pharmacy and they will forward the refill request to us. Please allow 3 business days for your refill to be completed.          Additional Information About Your Visit        MyChart Information     [employer] lets you send messages to your doctor, view your test results, renew your prescriptions, schedule appointments and more. To sign up, go to [email or web address] . Click on \"Log in\" on the left side of the screen, which will take you to the Welcome page. Then click on \"Sign up Now\" on the right side of the page.     You will be asked to enter the access code listed below, as well as some personal information. Please follow the directions to create your " "username and password.     Your access code is: [email or web address]  Expires: 2017 12:17 PM     Your access code will  in 90 days. If you need help or a new code, please call your [facility] or [phone].        Care EveryWhere ID     This is your Care EveryWhere ID. This could be used by other organizations to access your [place] medical records  [id number]        Your Vitals Were     Pulse Height Last Period Pulse Oximetry BMI (Body Mass Index)       67 5' 4\" (1.626 m) 2016 (Exact Date) 98% 26.78 kg/m2        Blood Pressure from Last 3 Encounters:   17 116/71   17 110/64   17 105/68    Weight from Last 3 Encounters:   17 156 lb (70.8 kg)   17 154 lb (69.9 kg)   17 150 lb (68 kg)              Today, you had the following     No orders found for display       Primary Care Provider Office Phone # Fax #    Itzel HerreraMAYA barnes [phone]       [facility] [address] [address]        Thank you!     Thank you for choosing [facility]  for your care. Our goal is always to provide you with excellent care. Hearing back from our patients is one way we can continue to improve our services. Please take a few minutes to complete the written survey that you may receive in the mail after your visit with us. Thank you!             Your Updated Medication List - Protect others around you: Learn how to safely use, store and throw away your medicines at www.disposemymeds.org.          This list is accurate as of: 3/7/17  5:00 PM.  Always use your most recent med list.                   Brand Name Dispense Instructions for use    METAMUCIL PO      Take 1 tablet by mouth daily       PRENATAL VITAMINS PO      Take 1 tablet by mouth daily         "

## 2017-03-14 ENCOUNTER — PRENATAL OFFICE VISIT (OUTPATIENT)
Dept: OBGYN | Facility: OTHER | Age: 27
End: 2017-03-14
Attending: OBSTETRICS & GYNECOLOGY
Payer: COMMERCIAL

## 2017-03-14 VITALS
OXYGEN SATURATION: 94 % | WEIGHT: 156 LBS | SYSTOLIC BLOOD PRESSURE: 110 MMHG | HEIGHT: 64 IN | BODY MASS INDEX: 26.63 KG/M2 | DIASTOLIC BLOOD PRESSURE: 73 MMHG | HEART RATE: 77 BPM

## 2017-03-14 DIAGNOSIS — O09.93 PREGNANCY, SUPERVISION OF, HIGH-RISK, THIRD TRIMESTER: Primary | ICD-10-CM

## 2017-03-14 PROCEDURE — 99207 ZZC PRENATAL VISIT: CPT | Performed by: OBSTETRICS & GYNECOLOGY

## 2017-03-14 ASSESSMENT — PAIN SCALES - GENERAL: PAINLEVEL: NO PAIN (0)

## 2017-03-14 NOTE — NURSING NOTE
"Chief Complaint   Patient presents with     Prenatal Care     38 weeks       Initial /73 (BP Location: Left arm, Cuff Size: Adult Regular)  Pulse 77  Ht 5' 4\" (1.626 m)  Wt 156 lb (70.8 kg)  LMP 06/21/2016 (Exact Date)  SpO2 94%  BMI 26.78 kg/m2 Estimated body mass index is 26.78 kg/(m^2) as calculated from the following:    Height as of this encounter: 5' 4\" (1.626 m).    Weight as of this encounter: 156 lb (70.8 kg).  Medication Reconciliation: complete     Chrissy Alegria      "

## 2017-03-14 NOTE — MR AVS SNAPSHOT
"              After Visit Summary   3/14/2017    Shy Catherine    MRN: [id number]           Patient Information     Date Of Birth          1990        Visit Information        Provider Department      3/14/2017 4:00 PM Thiago Downey MD [facility]        Today's Diagnoses     Pregnancy, supervision of, high-risk, third trimester    -  1      Care Instructions    F/u 1 wk        Follow-ups after your visit        Who to contact     If you have questions or need follow up information about today's clinic visit or your schedule please contact [facility] directly at [phone].  Normal or non-critical lab and imaging results will be communicated to you by [employer], letter or phone within 4 business days after the clinic has received the results. If you do not hear from us within 7 days, please contact the clinic through [employer] or phone. If you have a critical or abnormal lab result, we will notify you by phone as soon as possible.  Submit refill requests through [employer] or call your pharmacy and they will forward the refill request to us. Please allow 3 business days for your refill to be completed.          Additional Information About Your Visit        MyChart Information     [employer] lets you send messages to your doctor, view your test results, renew your prescriptions, schedule appointments and more. To sign up, go to [email or web address] . Click on \"Log in\" on the left side of the screen, which will take you to the Welcome page. Then click on \"Sign up Now\" on the right side of the page.     You will be asked to enter the access code listed below, as well as some personal information. Please follow the directions to create your username and password.     Your access code is: [email or web address]  Expires: 2017  1:17 PM     Your access code will  in 90 days. If you need help or a new code, please call your [facility] or [phone].        Care EveryWhere ID     This " "is your Care EveryWhere ID. This could be used by other organizations to access your [place] medical records  [id number]        Your Vitals Were     Pulse Height Last Period Pulse Oximetry BMI (Body Mass Index)       77 5' 4\" (1.626 m) 06/21/2016 (Exact Date) 94% 26.78 kg/m2        Blood Pressure from Last 3 Encounters:   03/14/17 110/73   03/07/17 116/71   02/28/17 110/64    Weight from Last 3 Encounters:   03/14/17 156 lb (70.8 kg)   03/07/17 156 lb (70.8 kg)   02/28/17 154 lb (69.9 kg)              Today, you had the following     No orders found for display       Primary Care Provider Office Phone # Fax #    Itzel MAYA Ayala [phone]       [address] [address]        Thank you!     Thank you for choosing [facility]  for your care. Our goal is always to provide you with excellent care. Hearing back from our patients is one way we can continue to improve our services. Please take a few minutes to complete the written survey that you may receive in the mail after your visit with us. Thank you!             Your Updated Medication List - Protect others around you: Learn how to safely use, store and throw away your medicines at www.disposemymeds.org.          This list is accurate as of: 3/14/17  4:12 PM.  Always use your most recent med list.                   Brand Name Dispense Instructions for use    METAMUCIL PO      Take 1 tablet by mouth daily       PRENATAL VITAMINS PO      Take 1 tablet by mouth daily         "

## 2017-03-14 NOTE — PROGRESS NOTES
Doing well.  No concerns today.  She will report to OB if contractions every 5-10 minutes or if rupture of membranes.  RTC in 1 week.  Consider IOL next week if cvx favorable.  Denies regular contractions, vaginal bleeding, HUGO Downey MD  3/14/2017

## 2017-03-17 ENCOUNTER — HOSPITAL ENCOUNTER (OUTPATIENT)
Facility: HOSPITAL | Age: 27
Discharge: HOME | End: 2017-03-18
Attending: OBSTETRICS & GYNECOLOGY | Admitting: OBSTETRICS & GYNECOLOGY
Payer: COMMERCIAL

## 2017-03-17 VITALS
WEIGHT: 156 LBS | HEART RATE: 92 BPM | BODY MASS INDEX: 26.63 KG/M2 | TEMPERATURE: 98.3 F | DIASTOLIC BLOOD PRESSURE: 54 MMHG | SYSTOLIC BLOOD PRESSURE: 110 MMHG | RESPIRATION RATE: 16 BRPM | OXYGEN SATURATION: 97 % | HEIGHT: 64 IN

## 2017-03-17 LAB
ALBUMIN UR-MCNC: 10 MG/DL
APPEARANCE UR: ABNORMAL
BACTERIA #/AREA URNS HPF: ABNORMAL /HPF
BILIRUB UR QL STRIP: NEGATIVE
COLOR UR AUTO: ABNORMAL
GLUCOSE UR STRIP-MCNC: 30 MG/DL
HGB UR QL STRIP: NEGATIVE
KETONES UR STRIP-MCNC: 5 MG/DL
LEUKOCYTE ESTERASE UR QL STRIP: ABNORMAL
MUCOUS THREADS #/AREA URNS LPF: PRESENT /LPF
NITRATE UR QL: NEGATIVE
PH UR STRIP: 5.5 PH (ref 4.7–8)
RBC #/AREA URNS AUTO: 6 /HPF (ref 0–2)
SP GR UR STRIP: 1.01 (ref 1–1.03)
SQUAMOUS #/AREA URNS AUTO: 5 /HPF (ref 0–1)
URN SPEC COLLECT METH UR: ABNORMAL
UROBILINOGEN UR STRIP-MCNC: NORMAL MG/DL (ref 0–2)
WBC #/AREA URNS AUTO: 8 /HPF (ref 0–2)

## 2017-03-17 PROCEDURE — 87088 URINE BACTERIA CULTURE: CPT | Performed by: OBSTETRICS & GYNECOLOGY

## 2017-03-17 PROCEDURE — 81001 URINALYSIS AUTO W/SCOPE: CPT | Performed by: OBSTETRICS & GYNECOLOGY

## 2017-03-17 PROCEDURE — 87086 URINE CULTURE/COLONY COUNT: CPT | Performed by: OBSTETRICS & GYNECOLOGY

## 2017-03-17 NOTE — IP AVS SNAPSHOT
MRN:[id number]                      After Visit Summary   3/17/2017    Shy Catherine    MRN: [id number]           Thank you!     Thank you for choosing [facility] for your care. Our goal is always to provide you with excellent care. Hearing back from our patients is one way we can continue to improve our services. Please take a few minutes to complete the written survey that you may receive in the mail after you visit with us. Thank you!        Patient Information     Date Of Birth          1990        About your hospital stay     You were admitted on:  March 17, 2017 You last received care in the:  [facility]    You were discharged on:  March 18, 2017       Who to Call     For medical emergencies, please call 911.  For non-urgent questions about your medical care, please call your primary care provider or clinic, [phone]          Attending Provider     Provider Specialty    Ko Daugherty MD OB/Gyn       Primary Care Provider Office Phone # Fax #    Itzel AyalaMAYA [phone]       [address] [address]        Your next 10 appointments already scheduled     Mar 21, 2017  2:45 PM CDT   (Arrive by 2:30 PM)   ESTABLISHED PRENATAL with Thiago Downey MD   [facility] ([facility])    [address] [place] [address] [phone]              Further instructions from your care team       Discharge Instructions for Undelivered Patients    Diet:  * Drink 8 to 12 glasses of liquids (milk, juice, water) every day  * You may eat meals and snacks.    Activity:  * Count fetal kicks every day.  * Call your doctor if your baby is moving less than usual.    Call your provider if you notice:  * Swelling in your face or increased swelling in your hands or legs.  * Headaches that are not relieved by Tylenol (acetaminophen).  * Changes in your vision (blurring; seeing spots or stars).  * Nausea (sick to your stomach) and  "vomiting (throwing up).  * Weight gain of 5 pounds per week.  * Heartburn that doesn't go away.  * Signs of bladder infection: Pain when you urinate (use the toilet), needing to go more often or more urgently.  * The bag of coronel (membrane) breaks, or you notice leaking in your underwear.  * Bright red blood in your underwear.  * Abdominal (lower belly) or stomach pain.  * For first baby: Contractions (tightenings) less than 5 minutes apart for one hour or more.  * Second (plus) baby: Contractions (tightenings) less than 10 minutes apart and getting stronger.  * Increase or change in vaginal discharge (note the color and amount).        [facility] [place]: [phone]        Pending Results     Date and Time Order Name Status Description    3/17/2017 2341 Urine Culture Aerobic Bacterial In process             Admission Information     Date & Time Provider Department Dept. Phone    3/17/2017 Ko Daugherty MD [facility] [phone]      Your Vitals Were     Blood Pressure Pulse Temperature Respirations Height Weight    110/54 92 98.3  F (36.8  C) (Oral) 16 1.626 m (5' 4\") 70.8 kg (156 lb)    Last Period Pulse Oximetry BMI (Body Mass Index)             2016 (Exact Date) 97% 26.78 kg/m2         [email or web address] Information     [employer] lets you send messages to your doctor, view your test results, renew your prescriptions, schedule appointments and more. To sign up, go to [email or web address] . Click on \"Log in\" on the left side of the screen, which will take you to the Welcome page. Then click on \"Sign up Now\" on the right side of the page.     You will be asked to enter the access code listed below, as well as some personal information. Please follow the directions to create your username and password.     Your access code is: [email or web address]  Expires: 2017  1:17 PM     Your access code will  in 90 days. If you need help or a new code, please call your [facility] or " [phone].        Care EveryWhere ID     This is your Care EveryWhere ID. This could be used by other organizations to access your [place] medical records  [id number]           Review of your medicines      UNREVIEWED medicines. Ask your doctor about these medicines        Dose / Directions    METAMUCIL PO        Dose:  1 tablet   Take 1 tablet by mouth daily   Refills:  0       PRENATAL VITAMINS PO        Dose:  1 tablet   Take 1 tablet by mouth daily   Refills:  0                Protect others around you: Learn how to safely use, store and throw away your medicines at www.disposemymeds.org.             Medication List: This is a list of all your medications and when to take them. Check marks below indicate your daily home schedule. Keep this list as a reference.      Medications           Morning Afternoon Evening Bedtime As Needed    METAMUCIL PO   Take 1 tablet by mouth daily                                PRENATAL VITAMINS PO   Take 1 tablet by mouth daily

## 2017-03-17 NOTE — IP AVS SNAPSHOT
[facility]    [address] [address]    Phone:  [phone]    Fax:  [phone]                                       After Visit Summary   3/17/2017    Shy Catherine    MRN: [id number]           After Visit Summary Signature Page     I have received my discharge instructions, and my questions have been answered. I have discussed any challenges I see with this plan with the nurse or doctor.    ..........................................................................................................................................  Patient/Patient Representative Signature      ..........................................................................................................................................  Patient Representative Print Name and Relationship to Patient    ..................................................               ................................................  Date                                            Time    ..........................................................................................................................................  Reviewed by Signature/Title    ...................................................              ..............................................  Date                                                            Time

## 2017-03-18 PROCEDURE — 59025 FETAL NON-STRESS TEST: CPT

## 2017-03-18 PROCEDURE — 99213 OFFICE O/P EST LOW 20 MIN: CPT | Mod: 25

## 2017-03-18 NOTE — DISCHARGE INSTRUCTIONS

## 2017-03-18 NOTE — PLAN OF CARE
OB Triage Note  Shy Catherine  MRN: [id number]  Gestational Age: 38w3d      Shy Catherine presents for back pain.      States kaylee since 3pm.  Rates pain at 5/10.  Bleeding: none DENIES: LOF.    Dr. Daugherty notified of arrival and condition.  Oriented patient to surroundings. Call light within reach.     FHT: 150  NST Start Time:2320  NST Stop Time:2340  NST: Reactive .  Uterine Assessment:Contractions: frequency q 4-8 minutes of mild quality.    Plan:  -Initial NST, then fetal/uterine monitoring per MD/patient plan.  -Sterile vaginal exam/sterile speculum exam.  -Nursing education on early labor provided.

## 2017-03-20 LAB
BACTERIA SPEC CULT: ABNORMAL
MICRO REPORT STATUS: ABNORMAL
SPECIMEN SOURCE: ABNORMAL

## 2017-03-21 ENCOUNTER — PRENATAL OFFICE VISIT (OUTPATIENT)
Dept: OBGYN | Facility: OTHER | Age: 27
End: 2017-03-21
Attending: OBSTETRICS & GYNECOLOGY
Payer: COMMERCIAL

## 2017-03-21 VITALS
BODY MASS INDEX: 26.63 KG/M2 | DIASTOLIC BLOOD PRESSURE: 62 MMHG | WEIGHT: 156 LBS | HEIGHT: 64 IN | TEMPERATURE: 97.7 F | SYSTOLIC BLOOD PRESSURE: 112 MMHG

## 2017-03-21 DIAGNOSIS — O09.93 PREGNANCY, SUPERVISION OF, HIGH-RISK, THIRD TRIMESTER: Primary | ICD-10-CM

## 2017-03-21 PROCEDURE — 99207 ZZC PRENATAL VISIT: CPT | Performed by: OBSTETRICS & GYNECOLOGY

## 2017-03-21 ASSESSMENT — PAIN SCALES - GENERAL: PAINLEVEL: NO PAIN (0)

## 2017-03-21 NOTE — MR AVS SNAPSHOT
"              After Visit Summary   3/21/2017    Shy Catherine    MRN: [id number]           Patient Information     Date Of Birth          1990        Visit Information        Provider Department      3/21/2017 2:45 PM [facility]        Today's Diagnoses     Pregnancy, supervision of, high-risk, third trimester    -  1      Care Instructions    IOL Thursday        Follow-ups after your visit        Who to contact     If you have questions or need follow up information about today's clinic visit or your schedule please contact [facility] directly at [phone].  Normal or non-critical lab and imaging results will be communicated to you by [employer], letter or phone within 4 business days after the clinic has received the results. If you do not hear from us within 7 days, please contact the clinic through [employer] or phone. If you have a critical or abnormal lab result, we will notify you by phone as soon as possible.  Submit refill requests through [employer] or call your pharmacy and they will forward the refill request to us. Please allow 3 business days for your refill to be completed.          Additional Information About Your Visit        MyChart Information     [employer]wan lets you send messages to your doctor, view your test results, renew your prescriptions, schedule appointments and more. To sign up, go to [email or web address] . Click on \"Log in\" on the left side of the screen, which will take you to the Welcome page. Then click on \"Sign up Now\" on the right side of the page.     You will be asked to enter the access code listed below, as well as some personal information. Please follow the directions to create your username and password.     Your access code is: [email or web address]  Expires: 2017  1:17 PM     Your access code will  in 90 days. If you need help or a new code, please call your [facility] or [phone].        Care EveryWhere ID     " "This is your Care EveryWhere ID. This could be used by other organizations to access your [facility] medical records  [id number]        Your Vitals Were     Temperature Height Last Period BMI (Body Mass Index)          97.7  F (36.5  C) (Tympanic) 5' 4\" (1.626 m) 06/21/2016 (Exact Date) 26.78 kg/m2         Blood Pressure from Last 3 Encounters:   03/21/17 112/62   03/17/17 110/54   03/14/17 110/73    Weight from Last 3 Encounters:   03/21/17 156 lb (70.8 kg)   03/17/17 156 lb (70.8 kg)   03/14/17 156 lb (70.8 kg)              Today, you had the following     No orders found for display       Primary Care Provider Office Phone # Fax #    Itzel MAYA Ayala [phone]       [address] [address]        Thank you!     Thank you for choosing [facility]  for your care. Our goal is always to provide you with excellent care. Hearing back from our patients is one way we can continue to improve our services. Please take a few minutes to complete the written survey that you may receive in the mail after your visit with us. Thank you!             Your Updated Medication List - Protect others around you: Learn how to safely use, store and throw away your medicines at www.disposemymeds.org.          This list is accurate as of: 3/21/17 11:59 PM.  Always use your most recent med list.                   Brand Name Dispense Instructions for use    METAMUCIL PO      Take 1 tablet by mouth daily       PRENATAL VITAMINS PO      Take 1 tablet by mouth daily         "

## 2017-03-21 NOTE — NURSING NOTE
"Chief Complaint   Patient presents with     Prenatal Care     39 week       Initial /62  Temp 97.7  F (36.5  C) (Tympanic)  Ht 5' 4\" (1.626 m)  Wt 156 lb (70.8 kg)  LMP 06/21/2016 (Exact Date)  BMI 26.78 kg/m2 Estimated body mass index is 26.78 kg/(m^2) as calculated from the following:    Height as of this encounter: 5' 4\" (1.626 m).    Weight as of this encounter: 156 lb (70.8 kg).  Medication Reconciliation: complete   Rosemary Mcclure        "

## 2017-03-22 RX ORDER — METHYLERGONOVINE MALEATE 0.2 MG/ML
200 INJECTION INTRAVENOUS
Status: CANCELLED | OUTPATIENT
Start: 2017-03-22

## 2017-03-22 RX ORDER — CARBOPROST TROMETHAMINE 250 UG/ML
250 INJECTION, SOLUTION INTRAMUSCULAR
Status: CANCELLED | OUTPATIENT
Start: 2017-03-22

## 2017-03-22 RX ORDER — SODIUM CHLORIDE, SODIUM LACTATE, POTASSIUM CHLORIDE, CALCIUM CHLORIDE 600; 310; 30; 20 MG/100ML; MG/100ML; MG/100ML; MG/100ML
INJECTION, SOLUTION INTRAVENOUS CONTINUOUS
Status: CANCELLED | OUTPATIENT
Start: 2017-03-22

## 2017-03-22 RX ORDER — OXYTOCIN 10 [USP'U]/ML
10 INJECTION, SOLUTION INTRAMUSCULAR; INTRAVENOUS
Status: CANCELLED | OUTPATIENT
Start: 2017-03-22

## 2017-03-22 RX ORDER — NALOXONE HYDROCHLORIDE 0.4 MG/ML
.1-.4 INJECTION, SOLUTION INTRAMUSCULAR; INTRAVENOUS; SUBCUTANEOUS
Status: CANCELLED | OUTPATIENT
Start: 2017-03-22

## 2017-03-22 RX ORDER — MISOPROSTOL 200 UG/1
200 TABLET ORAL
Status: CANCELLED | OUTPATIENT
Start: 2017-03-22

## 2017-03-22 RX ORDER — LIDOCAINE 40 MG/G
CREAM TOPICAL
Status: CANCELLED | OUTPATIENT
Start: 2017-03-22

## 2017-03-22 RX ORDER — ONDANSETRON 2 MG/ML
4 INJECTION INTRAMUSCULAR; INTRAVENOUS EVERY 6 HOURS PRN
Status: CANCELLED | OUTPATIENT
Start: 2017-03-22

## 2017-03-22 RX ORDER — FENTANYL CITRATE 50 UG/ML
50-100 INJECTION, SOLUTION INTRAMUSCULAR; INTRAVENOUS
Status: CANCELLED | OUTPATIENT
Start: 2017-03-22

## 2017-03-22 RX ORDER — OXYCODONE AND ACETAMINOPHEN 5; 325 MG/1; MG/1
1 TABLET ORAL
Status: CANCELLED | OUTPATIENT
Start: 2017-03-22

## 2017-03-22 RX ORDER — OXYTOCIN/0.9 % SODIUM CHLORIDE 30/500 ML
100-340 PLASTIC BAG, INJECTION (ML) INTRAVENOUS CONTINUOUS PRN
Status: CANCELLED | OUTPATIENT
Start: 2017-03-22

## 2017-03-22 RX ORDER — IBUPROFEN 800 MG/1
800 TABLET, FILM COATED ORAL
Status: CANCELLED | OUTPATIENT
Start: 2017-03-22

## 2017-03-22 RX ORDER — ACETAMINOPHEN 325 MG/1
650 TABLET ORAL EVERY 4 HOURS PRN
Status: CANCELLED | OUTPATIENT
Start: 2017-03-22

## 2017-03-22 NOTE — PROGRESS NOTES
Doing well.  No concerns today.  Discussed indications, risks, complications and failure rate of inductions.  Favorable cvx.  Desires elective IOL.    Discussed signs of labor and when to call or come in.  RTC in 1 week  Denies regular contractions, vaginal bleeding, HUGO Downey MD  3/22/2017

## 2017-03-23 ENCOUNTER — ANESTHESIA (OUTPATIENT)
Dept: OBGYN | Facility: HOSPITAL | Age: 27
End: 2017-03-23
Payer: COMMERCIAL

## 2017-03-23 ENCOUNTER — ANESTHESIA EVENT (OUTPATIENT)
Dept: OBGYN | Facility: HOSPITAL | Age: 27
End: 2017-03-23
Payer: COMMERCIAL

## 2017-03-23 ENCOUNTER — HOSPITAL ENCOUNTER (INPATIENT)
Facility: HOSPITAL | Age: 27
LOS: 1 days | Discharge: HOME | End: 2017-03-24
Attending: OBSTETRICS & GYNECOLOGY | Admitting: OBSTETRICS & GYNECOLOGY
Payer: COMMERCIAL

## 2017-03-23 PROBLEM — Z34.90 TERM PREGNANCY: Status: ACTIVE | Noted: 2017-03-23

## 2017-03-23 LAB
ABO + RH BLD: NORMAL
ABO + RH BLD: NORMAL
BASOPHILS # BLD AUTO: 0 10E9/L (ref 0–0.2)
BASOPHILS NFR BLD AUTO: 0.3 %
DIFFERENTIAL METHOD BLD: ABNORMAL
EOSINOPHIL # BLD AUTO: 0.1 10E9/L (ref 0–0.7)
EOSINOPHIL NFR BLD AUTO: 1.1 %
ERYTHROCYTE [DISTWIDTH] IN BLOOD BY AUTOMATED COUNT: 13.5 % (ref 10–15)
HCT VFR BLD AUTO: 34.9 % (ref 35–47)
HGB BLD-MCNC: 11.6 G/DL (ref 11.7–15.7)
IMM GRANULOCYTES # BLD: 0.1 10E9/L (ref 0–0.4)
IMM GRANULOCYTES NFR BLD: 0.8 %
LYMPHOCYTES # BLD AUTO: 1.7 10E9/L (ref 0.8–5.3)
LYMPHOCYTES NFR BLD AUTO: 26.3 %
MCH RBC QN AUTO: 28.9 PG (ref 26.5–33)
MCHC RBC AUTO-ENTMCNC: 33.2 G/DL (ref 31.5–36.5)
MCV RBC AUTO: 87 FL (ref 78–100)
MONOCYTES # BLD AUTO: 0.4 10E9/L (ref 0–1.3)
MONOCYTES NFR BLD AUTO: 5.4 %
NEUTROPHILS # BLD AUTO: 4.3 10E9/L (ref 1.6–8.3)
NEUTROPHILS NFR BLD AUTO: 66.1 %
NRBC # BLD AUTO: 0 10*3/UL
NRBC BLD AUTO-RTO: 0 /100
PLATELET # BLD AUTO: 228 10E9/L (ref 150–450)
RBC # BLD AUTO: 4.01 10E12/L (ref 3.8–5.2)
SPECIMEN EXP DATE BLD: NORMAL
WBC # BLD AUTO: 6.5 10E9/L (ref 4–11)

## 2017-03-23 PROCEDURE — 85025 COMPLETE CBC W/AUTO DIFF WBC: CPT | Performed by: OBSTETRICS & GYNECOLOGY

## 2017-03-23 PROCEDURE — 37000011 ZZH ANESTHESIA WARD SERVICE: Performed by: NURSE ANESTHETIST, CERTIFIED REGISTERED

## 2017-03-23 PROCEDURE — 25000128 H RX IP 250 OP 636

## 2017-03-23 PROCEDURE — 25000125 ZZHC RX 250: Performed by: NURSE ANESTHETIST, CERTIFIED REGISTERED

## 2017-03-23 PROCEDURE — 25000125 ZZHC RX 250

## 2017-03-23 PROCEDURE — 25000132 ZZH RX MED GY IP 250 OP 250 PS 637: Performed by: OBSTETRICS & GYNECOLOGY

## 2017-03-23 PROCEDURE — 59400 OBSTETRICAL CARE: CPT | Performed by: OBSTETRICS & GYNECOLOGY

## 2017-03-23 PROCEDURE — 10907ZC DRAINAGE OF AMNIOTIC FLUID, THERAPEUTIC FROM PRODUCTS OF CONCEPTION, VIA NATURAL OR ARTIFICIAL OPENING: ICD-10-PCS | Performed by: OBSTETRICS & GYNECOLOGY

## 2017-03-23 PROCEDURE — 00HU33Z INSERTION OF INFUSION DEVICE INTO SPINAL CANAL, PERCUTANEOUS APPROACH: ICD-10-PCS | Performed by: NURSE ANESTHETIST, CERTIFIED REGISTERED

## 2017-03-23 PROCEDURE — 25800025 ZZH RX 258: Performed by: OBSTETRICS & GYNECOLOGY

## 2017-03-23 PROCEDURE — S0191 MISOPROSTOL, ORAL, 200 MCG: HCPCS | Performed by: OBSTETRICS & GYNECOLOGY

## 2017-03-23 PROCEDURE — 86900 BLOOD TYPING SEROLOGIC ABO: CPT | Performed by: OBSTETRICS & GYNECOLOGY

## 2017-03-23 PROCEDURE — 36415 COLL VENOUS BLD VENIPUNCTURE: CPT | Performed by: OBSTETRICS & GYNECOLOGY

## 2017-03-23 PROCEDURE — 25000125 ZZHC RX 250: Performed by: OBSTETRICS & GYNECOLOGY

## 2017-03-23 PROCEDURE — 25900017 H RX MED GY IP 259 OP 259 PS 637: Performed by: OBSTETRICS & GYNECOLOGY

## 2017-03-23 PROCEDURE — S0020 INJECTION, BUPIVICAINE HYDRO: HCPCS

## 2017-03-23 PROCEDURE — 3E0S3CZ INTRODUCTION OF REGIONAL ANESTHETIC INTO EPIDURAL SPACE, PERCUTANEOUS APPROACH: ICD-10-PCS | Performed by: NURSE ANESTHETIST, CERTIFIED REGISTERED

## 2017-03-23 PROCEDURE — 3E0R3CZ INTRODUCTION OF REGIONAL ANESTHETIC INTO SPINAL CANAL, PERCUTANEOUS APPROACH: ICD-10-PCS | Performed by: NURSE ANESTHETIST, CERTIFIED REGISTERED

## 2017-03-23 PROCEDURE — 86901 BLOOD TYPING SEROLOGIC RH(D): CPT | Performed by: OBSTETRICS & GYNECOLOGY

## 2017-03-23 PROCEDURE — 12000031 ZZH R&B OB CRITICAL

## 2017-03-23 RX ORDER — MISOPROSTOL 200 UG/1
200 TABLET ORAL
Status: DISCONTINUED | OUTPATIENT
Start: 2017-03-23 | End: 2017-03-23

## 2017-03-23 RX ORDER — LANOLIN 100 %
OINTMENT (GRAM) TOPICAL
Status: DISCONTINUED | OUTPATIENT
Start: 2017-03-23 | End: 2017-03-24 | Stop reason: HOSPADM

## 2017-03-23 RX ORDER — SODIUM CHLORIDE, SODIUM LACTATE, POTASSIUM CHLORIDE, CALCIUM CHLORIDE 600; 310; 30; 20 MG/100ML; MG/100ML; MG/100ML; MG/100ML
INJECTION, SOLUTION INTRAVENOUS CONTINUOUS
Status: DISCONTINUED | OUTPATIENT
Start: 2017-03-23 | End: 2017-03-23 | Stop reason: CLARIF

## 2017-03-23 RX ORDER — ONDANSETRON 2 MG/ML
4 INJECTION INTRAMUSCULAR; INTRAVENOUS EVERY 6 HOURS PRN
Status: DISCONTINUED | OUTPATIENT
Start: 2017-03-23 | End: 2017-03-24 | Stop reason: HOSPADM

## 2017-03-23 RX ORDER — EPHEDRINE SULFATE 50 MG/ML
5 INJECTION, SOLUTION INTRAMUSCULAR; INTRAVENOUS; SUBCUTANEOUS
Status: DISCONTINUED | OUTPATIENT
Start: 2017-03-23 | End: 2017-03-23

## 2017-03-23 RX ORDER — NALBUPHINE HYDROCHLORIDE 20 MG/ML
2.5-5 INJECTION, SOLUTION INTRAMUSCULAR; INTRAVENOUS; SUBCUTANEOUS EVERY 6 HOURS PRN
Status: DISCONTINUED | OUTPATIENT
Start: 2017-03-23 | End: 2017-03-23

## 2017-03-23 RX ORDER — OXYTOCIN 10 [USP'U]/ML
10 INJECTION, SOLUTION INTRAMUSCULAR; INTRAVENOUS
Status: DISCONTINUED | OUTPATIENT
Start: 2017-03-23 | End: 2017-03-23 | Stop reason: CLARIF

## 2017-03-23 RX ORDER — ACETAMINOPHEN 325 MG/1
650 TABLET ORAL EVERY 4 HOURS PRN
Status: DISCONTINUED | OUTPATIENT
Start: 2017-03-23 | End: 2017-03-24 | Stop reason: HOSPADM

## 2017-03-23 RX ORDER — HYDROCORTISONE 2.5 %
CREAM (GRAM) TOPICAL 3 TIMES DAILY PRN
Status: DISCONTINUED | OUTPATIENT
Start: 2017-03-23 | End: 2017-03-24 | Stop reason: HOSPADM

## 2017-03-23 RX ORDER — IBUPROFEN 400 MG/1
400-800 TABLET, FILM COATED ORAL EVERY 6 HOURS PRN
Status: DISCONTINUED | OUTPATIENT
Start: 2017-03-24 | End: 2017-03-24 | Stop reason: HOSPADM

## 2017-03-23 RX ORDER — BISACODYL 10 MG
10 SUPPOSITORY, RECTAL RECTAL DAILY PRN
Status: DISCONTINUED | OUTPATIENT
Start: 2017-03-25 | End: 2017-03-24 | Stop reason: HOSPADM

## 2017-03-23 RX ORDER — AMOXICILLIN 250 MG
1-2 CAPSULE ORAL 2 TIMES DAILY
Status: DISCONTINUED | OUTPATIENT
Start: 2017-03-23 | End: 2017-03-24 | Stop reason: HOSPADM

## 2017-03-23 RX ORDER — OXYTOCIN/0.9 % SODIUM CHLORIDE 30/500 ML
100 PLASTIC BAG, INJECTION (ML) INTRAVENOUS CONTINUOUS
Status: DISCONTINUED | OUTPATIENT
Start: 2017-03-23 | End: 2017-03-24 | Stop reason: HOSPADM

## 2017-03-23 RX ORDER — OXYTOCIN/0.9 % SODIUM CHLORIDE 30/500 ML
100-340 PLASTIC BAG, INJECTION (ML) INTRAVENOUS CONTINUOUS PRN
Status: DISCONTINUED | OUTPATIENT
Start: 2017-03-23 | End: 2017-03-24 | Stop reason: HOSPADM

## 2017-03-23 RX ORDER — METHYLERGONOVINE MALEATE 0.2 MG/ML
200 INJECTION INTRAVENOUS
Status: DISCONTINUED | OUTPATIENT
Start: 2017-03-23 | End: 2017-03-23 | Stop reason: CLARIF

## 2017-03-23 RX ORDER — EPHEDRINE SULFATE 50 MG/ML
INJECTION, SOLUTION INTRAMUSCULAR; INTRAVENOUS; SUBCUTANEOUS
Status: COMPLETED
Start: 2017-03-23 | End: 2017-03-23

## 2017-03-23 RX ORDER — LIDOCAINE 40 MG/G
CREAM TOPICAL
Status: DISCONTINUED | OUTPATIENT
Start: 2017-03-23 | End: 2017-03-23 | Stop reason: CLARIF

## 2017-03-23 RX ORDER — IBUPROFEN 800 MG/1
800 TABLET, FILM COATED ORAL
Status: COMPLETED | OUTPATIENT
Start: 2017-03-23 | End: 2017-03-23

## 2017-03-23 RX ORDER — ONDANSETRON 4 MG/1
4 TABLET, ORALLY DISINTEGRATING ORAL EVERY 6 HOURS PRN
Status: DISCONTINUED | OUTPATIENT
Start: 2017-03-23 | End: 2017-03-24 | Stop reason: HOSPADM

## 2017-03-23 RX ORDER — BUPIVACAINE HYDROCHLORIDE 2.5 MG/ML
INJECTION, SOLUTION EPIDURAL; INFILTRATION; INTRACAUDAL
Status: COMPLETED
Start: 2017-03-23 | End: 2017-03-23

## 2017-03-23 RX ORDER — NALOXONE HYDROCHLORIDE 0.4 MG/ML
.1-.4 INJECTION, SOLUTION INTRAMUSCULAR; INTRAVENOUS; SUBCUTANEOUS
Status: DISCONTINUED | OUTPATIENT
Start: 2017-03-23 | End: 2017-03-23 | Stop reason: CLARIF

## 2017-03-23 RX ORDER — MISOPROSTOL 200 UG/1
400 TABLET ORAL
Status: DISCONTINUED | OUTPATIENT
Start: 2017-03-23 | End: 2017-03-24 | Stop reason: HOSPADM

## 2017-03-23 RX ORDER — OXYTOCIN/0.9 % SODIUM CHLORIDE 30/500 ML
340 PLASTIC BAG, INJECTION (ML) INTRAVENOUS CONTINUOUS PRN
Status: DISCONTINUED | OUTPATIENT
Start: 2017-03-23 | End: 2017-03-24 | Stop reason: HOSPADM

## 2017-03-23 RX ORDER — CARBOPROST TROMETHAMINE 250 UG/ML
250 INJECTION, SOLUTION INTRAMUSCULAR
Status: DISCONTINUED | OUTPATIENT
Start: 2017-03-23 | End: 2017-03-23 | Stop reason: CLARIF

## 2017-03-23 RX ORDER — OXYTOCIN/0.9 % SODIUM CHLORIDE 30/500 ML
1-24 PLASTIC BAG, INJECTION (ML) INTRAVENOUS CONTINUOUS
Status: DISCONTINUED | OUTPATIENT
Start: 2017-03-23 | End: 2017-03-23 | Stop reason: CLARIF

## 2017-03-23 RX ORDER — NALOXONE HYDROCHLORIDE 0.4 MG/ML
.1-.4 INJECTION, SOLUTION INTRAMUSCULAR; INTRAVENOUS; SUBCUTANEOUS
Status: DISCONTINUED | OUTPATIENT
Start: 2017-03-23 | End: 2017-03-23

## 2017-03-23 RX ORDER — LIDOCAINE HYDROCHLORIDE AND EPINEPHRINE 15; 5 MG/ML; UG/ML
INJECTION, SOLUTION EPIDURAL PRN
Status: DISCONTINUED | OUTPATIENT
Start: 2017-03-23 | End: 2017-11-01

## 2017-03-23 RX ORDER — NALOXONE HYDROCHLORIDE 0.4 MG/ML
.1-.4 INJECTION, SOLUTION INTRAMUSCULAR; INTRAVENOUS; SUBCUTANEOUS
Status: DISCONTINUED | OUTPATIENT
Start: 2017-03-23 | End: 2017-03-24 | Stop reason: HOSPADM

## 2017-03-23 RX ORDER — ONDANSETRON 2 MG/ML
4 INJECTION INTRAMUSCULAR; INTRAVENOUS EVERY 6 HOURS PRN
Status: DISCONTINUED | OUTPATIENT
Start: 2017-03-23 | End: 2017-03-23 | Stop reason: CLARIF

## 2017-03-23 RX ORDER — SUFENTANIL CITRATE 50 UG/ML
INJECTION EPIDURAL; INTRAVENOUS
Status: COMPLETED
Start: 2017-03-23 | End: 2017-03-23

## 2017-03-23 RX ORDER — OXYCODONE AND ACETAMINOPHEN 5; 325 MG/1; MG/1
1 TABLET ORAL
Status: DISCONTINUED | OUTPATIENT
Start: 2017-03-23 | End: 2017-03-23 | Stop reason: CLARIF

## 2017-03-23 RX ORDER — ACETAMINOPHEN 325 MG/1
650 TABLET ORAL EVERY 4 HOURS PRN
Status: DISCONTINUED | OUTPATIENT
Start: 2017-03-23 | End: 2017-03-23 | Stop reason: CLARIF

## 2017-03-23 RX ORDER — FENTANYL CITRATE 50 UG/ML
50-100 INJECTION, SOLUTION INTRAMUSCULAR; INTRAVENOUS
Status: DISCONTINUED | OUTPATIENT
Start: 2017-03-23 | End: 2017-03-23 | Stop reason: CLARIF

## 2017-03-23 RX ORDER — OXYTOCIN 10 [USP'U]/ML
10 INJECTION, SOLUTION INTRAMUSCULAR; INTRAVENOUS
Status: DISCONTINUED | OUTPATIENT
Start: 2017-03-23 | End: 2017-03-23

## 2017-03-23 RX ADMIN — MISOPROSTOL 40 MCG: 200 TABLET ORAL at 12:59

## 2017-03-23 RX ADMIN — MISOPROSTOL 20 MCG: 200 TABLET ORAL at 11:51

## 2017-03-23 RX ADMIN — OXYTOCIN-SODIUM CHLORIDE 0.9% IV SOLN 30 UNIT/500ML 2 MILLI-UNITS/MIN: 30-0.9/5 SOLUTION at 17:29

## 2017-03-23 RX ADMIN — IBUPROFEN 800 MG: 800 TABLET ORAL at 20:30

## 2017-03-23 RX ADMIN — MISOPROSTOL 20 MCG: 200 TABLET ORAL at 09:40

## 2017-03-23 RX ADMIN — EPHEDRINE SULFATE 5 MG: 50 INJECTION, SOLUTION INTRAMUSCULAR; INTRAVENOUS; SUBCUTANEOUS at 19:48

## 2017-03-23 RX ADMIN — Medication 8 ML/HR: at 19:42

## 2017-03-23 RX ADMIN — SODIUM CHLORIDE, POTASSIUM CHLORIDE, SODIUM LACTATE AND CALCIUM CHLORIDE: 600; 310; 30; 20 INJECTION, SOLUTION INTRAVENOUS at 17:35

## 2017-03-23 RX ADMIN — MISOPROSTOL 20 MCG: 200 TABLET ORAL at 08:35

## 2017-03-23 RX ADMIN — Medication 3 ML: at 19:35

## 2017-03-23 RX ADMIN — SUFENTANIL CITRATE 5 MCG: 50 INJECTION EPIDURAL; INTRAVENOUS at 16:45

## 2017-03-23 RX ADMIN — BUPIVACAINE HYDROCHLORIDE 1.6 ML: 2.5 INJECTION, SOLUTION EPIDURAL; INFILTRATION; INTRACAUDAL at 16:45

## 2017-03-23 RX ADMIN — Medication 5 MG: at 19:48

## 2017-03-23 RX ADMIN — SODIUM CHLORIDE, POTASSIUM CHLORIDE, SODIUM LACTATE AND CALCIUM CHLORIDE 1000 ML: 600; 310; 30; 20 INJECTION, SOLUTION INTRAVENOUS at 16:16

## 2017-03-23 NOTE — H&P
[facility] Labor and Delivery History and Physical    Shy Catherine MRN# [id number]   Age: 26 year old YOB: 1990     Date of Admission:  3/23/2017    Primary care provider: Itzel Ayala           Chief Complaint:   Shy Catherine is a 26 year old female who is 39w2d pregnant and being admitted for induction of labor, indication elective. Prenatal record/H and P reviewed with patient and unchanged.          Pregnancy history:     OBSTETRIC HISTORY:    Obstetric History       T0      TAB0   SAB0   E0   M0   L0       # Outcome Date GA Lbr Prateek/2nd Weight Sex Delivery Anes PTL Lv   2 Current            1 Para                   EDC: Estimated Date of Delivery: Mar 28, 2017    Prenatal Labs:   Lab Results   Component Value Date    ABO A 2017    RH  Pos 2017    AS Neg 2017    HEPBANG Nonreactive 2016    CHPCRT  2016     Negative   Negative for C. trachomatis rRNA by transcription mediated amplification.   A negative result by transcription mediated amplification does not preclude the   presence of C. trachomatis infection because results are dependent on proper   and adequate collection, absence of inhibitors, and sufficient rRNA to be   detected.      GCPCRT  2016     Negative   Negative for N. gonorrhoeae rRNA by transcription mediated amplification.   A negative result by transcription mediated amplification does not preclude the   presence of N. gonorrhoeae infection because results are dependent on proper   and adequate collection, absence of inhibitors, and sufficient rRNA to be   detected.      TREPAB Negative 2016    HGB 11.6 (L) 2017       GBS Status:   Lab Results   Component Value Date    GBS  2017     Negative  No GBS DNA detected, presumed negative for GBS or number of bacteria may be   below the limit of detection of the assay.   Assay performed on incubated broth culture of specimen using Cognitive Code real-time    PCR.         Active Problem List  Patient Active Problem List   Diagnosis     Pyelonephritis     Ninfa-Parkinson-White (WPW) syndrome     ACP (advance care planning)     Atopic rhinitis     Developmental coordination disorder     Acquired deformity of toe     Congenital deformity of hip joint     Intractable migraine     Vaginal spotting     Supervision of other normal pregnancy, antepartum     Encounter for triage in pregnant patient     Term pregnancy       Medication Prior to Admission  Prescriptions Prior to Admission   Medication Sig Dispense Refill Last Dose     Psyllium (METAMUCIL PO) Take 1 tablet by mouth daily   3/22/2017 at 0800     Prenatal Multivit-Min-Fe-FA (PRENATAL VITAMINS PO) Take 1 tablet by mouth daily   3/22/2017 at 0800   .        Maternal Past Medical History:     Past Medical History:   Diagnosis Date     Chronic pain      Interstitial cystitis      Ninfa-Parkinson-White (WPW) syndrome 2/29/2016                       Family History:   Unchanged from prenatal record            Social History:   Unchanged from prenatal record         Review of Systems:   Per HPI.  Other systems reviewed and negative         Physical Exam:     Vitals:    03/23/17 0751   BP: 117/68   Pulse: 70   Resp: 18   Temp: 98.3  F (36.8  C)   TempSrc: Tympanic   SpO2: 98%     Chest: CTA  CV:  RRR without murmers  General:  Alert and oriented  Abdomen soft, non-tender, gravid  Ext: neg  Cervix:   Membranes: intact   Dilation: 3   Effacement: 60%   Station:-1   Consistency: soft   Position: Anterior  Presentation:Cephalic  Fetal Heart Rate Tracing: reactive and reassuring  Tocometer: external monitor                       Assessment:   Shy Catherine is a 39w2d pregnant female admitted with induction of labor, indication elective.          Plan:   Admit, Routine intrapartum care and monitoring per protocol.  Patient is aware of the risks of an induction including medications used, risk, benefits and alternatives.  She is  willing to take these risks and would like to proceed.  Cytotec protocol ordered.      Thiago Downey MD

## 2017-03-23 NOTE — PROGRESS NOTES
(S)  Doing welll.  Contractions spaced out after last dose.  40mcg Ctytotec.    (O)  Vitals:    03/23/17 0751 03/23/17 0957   BP: 117/68 100/59   Pulse: 70 80   Resp: 18 18   Temp: 98.3  F (36.8  C) 98.4  F (36.9  C)   TempSrc: Tympanic Oral   SpO2: 98% 96%     Cervix:   Membranes: AROM  clear amniotic fluid   Dilation: 3   Effacement: 70%   Station:-1    Fetal Heart Rate Tracing: reactive and reassuring  Cat:1          Tocometer: external monitor and inadequate    (A/P)  AROM.  CPM.

## 2017-03-23 NOTE — ANESTHESIA PREPROCEDURE EVALUATION
Anesthesia Evaluation       history and physical reviewed .      No history of anesthetic complications          ROS/MED HX    ENT/Pulmonary:  - neg pulmonary ROS     Neurologic:  - neg neurologic ROS     Cardiovascular:  - neg cardiovascular ROS       METS/Exercise Tolerance:     Hematologic:         Musculoskeletal:         GI/Hepatic:  - neg GI/hepatic ROS       Renal/Genitourinary:         Endo:         Psychiatric:         Infectious Disease:         Malignancy:         Other:                     Physical Exam  Normal systems: cardiovascular, pulmonary and dental    Airway   Mallampati: II  TM distance: > 3 FB  Neck ROM: full  Mouth opening: > 3 cm    Dental     Cardiovascular       Pulmonary     Other findings: Platelets >100      neg OB ROS                 Anesthesia Plan      History & Physical Review      ASA Status:  .  OB Epidural Asa: 2       Plan for     Discussed risks and benefits of spinal anesthetic with patient including itching, sore back, infection, hematoma, spinal headache, CV complications, nerve damage, inability to place, conversion to general anesthesia, loss of airway, and death. Pt wishes to proceed.       Postoperative Care      Consents  Anesthetic plan, risks, benefits and alternatives discussed with:  Patient..                          .

## 2017-03-23 NOTE — PLAN OF CARE
Induction of Labor Admit Note  Shy Catherine  MRN: [id number]  Gestational Age: 39w2d      Shy Catherine presents for induction of labor for Elective in reason.  Patient denies contractions, bleeding or ROM.    Past Medical History:   Diagnosis Date     Chronic pain      Interstitial cystitis      Ninfa-Parkinson-White (WPW) syndrome 2016     OB Induction Plan: Completed and available in epic chart.    Dr. Downey notified of patient's arrival and condition.      Patient is alert and oriented X 3, denies any pain. pain. Patient oriented to room, unit, hourly rounding, and plan of care. Call light within reach.  Explained admission packet with patient bill of rights brochure. Will continue to monitor and document as needed.     Inpatient nursing criteria listed below was met:    Health care directives status obtained and documented: Yes  Patient identifies a surrogate decision maker: No  Core Measure diagnosis present:: No  Vaccine assessment done and vaccines ordered if appropriate. Yes-Pt vaccinations up to date  Clergy visit ordered if patient requests: N/A  Skin issues/needs documented:Yes- No current skin issues  Isolation needs addressed, if appropriate: N/A  Fall Prevention (Med and High risk): Care plan updated, Education given and documented and signage used: Yes  Care Plan initiated: Yes  Education Documented (Reminder to educate patient if MRSA is present on admission): Yes  Education Assessment documented:Yes  Patient has discharge needs (If yes, please explain): {YES / NO:[id number]    Plan:   Physician will see vianey first/ plan MARTINEZ

## 2017-03-23 NOTE — ANESTHESIA PROCEDURE NOTES
Peripheral nerve/Neuraxial procedure note : intrathecal  Pre-Procedure  Performed by   Referred by PHILIPPE  Location: OB    Procedure Times:3/23/2017 4:40 PM and 3/23/2017 4:48 PM  Pre-Anesthestic Checklist: patient identified, IV checked, site marked, risks and benefits discussed, informed consent, monitors and equipment checked, pre-op evaluation and at physician/surgeon's request    Timeout  Correct Patient: Yes   Correct Procedure: Yes   Correct Site: Yes   Correct Laterality: N/A   Correct Position: Yes   Site Marked: N/A   .   Procedure Documentation  ASA 2 and Emergent  Diagnosis:labor pain.    Procedure:    Intrathecal.  Insertion Site:L3-4  (midline approach)      Patient Prep;mask, sterile gloves, chlorhexidine gluconate and isopropyl alcohol, patient draped.  .  Needle: (). # of attempts: 1. # of redirects:. Spinal Needle: Crescencio tip 25 G. 3.5 in.  Introducer used. . .     Assessment/Narrative  Paresthesias: No.  .  .  clear CSF fluid removed . Time Injected: 16:45  Sensory Level: T6

## 2017-03-23 NOTE — IP AVS SNAPSHOT
[facility]    [address] [address]    Phone:  [phone]    Fax:  [phone]                                       After Visit Summary   3/23/2017    Shy Catherine    MRN: [id number]           After Visit Summary Signature Page     I have received my discharge instructions, and my questions have been answered. I have discussed any challenges I see with this plan with the nurse or doctor.    ..........................................................................................................................................  Patient/Patient Representative Signature      ..........................................................................................................................................  Patient Representative Print Name and Relationship to Patient    ..................................................               ................................................  Date                                            Time    ..........................................................................................................................................  Reviewed by Signature/Title    ...................................................              ..............................................  Date                                                            Time

## 2017-03-23 NOTE — IP AVS SNAPSHOT
MRN:[id number]                      After Visit Summary   3/23/2017    Shy Catherine    MRN: [id number]           Thank you!     Thank you for choosing [facility] for your care. Our goal is always to provide you with excellent care. Hearing back from our patients is one way we can continue to improve our services. Please take a few minutes to complete the written survey that you may receive in the mail after you visit with us. Thank you!        Patient Information     Date Of Birth          1990        About your hospital stay     You were admitted on:  2017 You last received care in the:  [facility]    You were discharged on:  2017       Who to Call     For medical emergencies, please call 911.  For non-urgent questions about your medical care, please call your primary care provider or clinic, [phone]          Attending Provider     Provider Specialty    Thiago Downey MD OB/Gyn       Primary Care Provider Office Phone # Fax #    Itzel MAYA Ayala [phone]       [address] [address]        Your next 10 appointments already scheduled     2017 11:00 AM CDT   (Arrive by 10:45 AM)   Post Partum with Jami Baer NP   [facility]    [address] [facility] [id number] [phone]            May 05, 2017  1:30 PM CDT   (Arrive by 1:15 PM)   Post Partum with Thiago Downey MD   [facility]    [address] [facility] [address]   [phone]              Further instructions from your care team       Pelvic rest for 4 weeks  No vigorous activity, exercise, for 2 weeks.  Then may resume normal activity as tolerated.  Schedule outpatient  lactation f/u 2-4 days prn.    Schedule Postpartum check 2 weeks Jami Baer NP and 6 weeks Dr. Downey  Call Dr. Downey [phone] as necessary if problems in interim    Vaginal Delivery or  Birth  "  Discharge Instructions    Activity: Go back to your normal activities    Diet: You may eat a regular diet. Drink plenty of fluids.      Call your Doctor  if you have any of these symptoms:    * You soak a sanitary pad with blood within 1 hour, or you see clots larger than a  golf ball.    * Bleeding that lasts more than 6 weeks.     *Bad-smelling fluid that comes out of your vagina.    *A fever above 100.4 degrees Fahrenheit (38.4 degrees Celsius) with or without chills.    * Severe pain, cramping, or tenderness in your lower belly area.    * Increased pain, swelling, redness or fluid around your stitches.    * A need to urinate more frequently (use the toilet more often), more urgently (use the toilet very quickly), or it burns when you urinate.    * Redness, swelling, or pain around a vein in your leg.    * Problems coping with sadness, anxiety, or depression.    * Problems breastfeeding, or a red or painful area on your breast.    * You have questions or concerns after you return home.      [facility] [place]: [phone]    Pending Results     No orders found for last 3 day(s).            Statement of Approval     Ordered          03/24/17 0807  I have reviewed and agree with all the recommendations and orders detailed in this document.  EFFECTIVE NOW     Approved and electronically signed by:  Thiago Downey MD             Admission Information     Date & Time Provider Department Dept. Phone    3/23/2017 Thiago Downey MD [facility] [phone]      Your Vitals Were     Blood Pressure Pulse Temperature Respirations Last Period Pulse Oximetry    103/60 70 98.1  F (36.7  C) (Oral) 16 06/21/2016 (Exact Date) 98%      MyChart Information     [employer] lets you send messages to your doctor, view your test results, renew your prescriptions, schedule appointments and more. To sign up, go to [email or web address] . Click on \"Log in\" on the left side of the screen, which will take you to the " "Welcome page. Then click on \"Sign up Now\" on the right side of the page.     You will be asked to enter the access code listed below, as well as some personal information. Please follow the directions to create your username and password.     Your access code is: [email or web address]  Expires: 2017  1:17 PM     Your access code will  in 90 days. If you need help or a new code, please call your [facility] or [phone].        Care EveryWhere ID     This is your Care EveryWhere ID. This could be used by other organizations to access your [facility] medical records  [id number]           Review of your medicines      CONTINUE these medicines which have NOT CHANGED        Dose / Directions    METAMUCIL PO        Dose:  1 tablet   Take 1 tablet by mouth daily   Refills:  0       PRENATAL VITAMINS PO        Dose:  1 tablet   Take 1 tablet by mouth daily   Refills:  0                Protect others around you: Learn how to safely use, store and throw away your medicines at www.disposemymeds.org.             Medication List: This is a list of all your medications and when to take them. Check marks below indicate your daily home schedule. Keep this list as a reference.      Medications           Morning Afternoon Evening Bedtime As Needed    METAMUCIL PO   Take 1 tablet by mouth daily                                PRENATAL VITAMINS PO   Take 1 tablet by mouth daily                                  "

## 2017-03-23 NOTE — PLAN OF CARE
States contractions getting more painful but staying in the tub is helpful.  On knees,  leaning over tub and helps with pain.

## 2017-03-24 VITALS
SYSTOLIC BLOOD PRESSURE: 114 MMHG | RESPIRATION RATE: 16 BRPM | TEMPERATURE: 98.7 F | DIASTOLIC BLOOD PRESSURE: 59 MMHG | HEART RATE: 70 BPM | OXYGEN SATURATION: 98 %

## 2017-03-24 LAB — HGB BLD-MCNC: 10.7 G/DL (ref 11.7–15.7)

## 2017-03-24 PROCEDURE — 85018 HEMOGLOBIN: CPT | Performed by: OBSTETRICS & GYNECOLOGY

## 2017-03-24 PROCEDURE — 25000132 ZZH RX MED GY IP 250 OP 250 PS 637: Performed by: OBSTETRICS & GYNECOLOGY

## 2017-03-24 PROCEDURE — 72200001 ZZH LABOR CARE VAGINAL DELIVERY SINGLE

## 2017-03-24 PROCEDURE — 36415 COLL VENOUS BLD VENIPUNCTURE: CPT | Performed by: OBSTETRICS & GYNECOLOGY

## 2017-03-24 RX ADMIN — VITAMIN A ACETATE, .BETA.-CAROTENE, ASCORBIC ACID, CHOLECALCIFEROL, .ALPHA.-TOCOPHEROL ACETATE, DL-, THIAMINE MONONITRATE, RIBOFLAVIN, NIACINAMIDE, PYRIDOXINE HYDROCHLORIDE, FOLIC ACID, CYANOCOBALAMIN, CALCIUM CARBONATE, FERROUS FUMARATE, ZINC OXIDE, AND CUPRIC OXIDE 1 TABLET: 2000; 2000; 120; 400; 22; 1.84; 3; 20; 10; 1; 12; 200; 27; 25; 2 TABLET ORAL at 08:50

## 2017-03-24 RX ADMIN — IBUPROFEN 800 MG: 400 TABLET ORAL at 20:08

## 2017-03-24 RX ADMIN — ACETAMINOPHEN 650 MG: 325 TABLET, FILM COATED ORAL at 17:08

## 2017-03-24 RX ADMIN — SENNOSIDES AND DOCUSATE SODIUM 1 TABLET: 8.6; 5 TABLET ORAL at 08:50

## 2017-03-24 RX ADMIN — IBUPROFEN 800 MG: 400 TABLET ORAL at 11:27

## 2017-03-24 RX ADMIN — IBUPROFEN 800 MG: 400 TABLET ORAL at 03:31

## 2017-03-24 NOTE — PLAN OF CARE
Face to face report given with opportunity to observe patient.    Report given to Leigh Buenrostro   3/24/2017  7:07 AM

## 2017-03-24 NOTE — PLAN OF CARE
Problem: Goal Outcome Summary  Goal: Goal Outcome Summary  Outcome: Improving  Assessments completed as charted. B/P: 103/60, T: 98.1, P: 70, R: 16. Rates pain: 6/10 to her coccyx and uterine cramping, pt does report improvement with ibuprofen. Voiding without difficulty. Fundus: Midline at U-2. Lochia: Light. Activity: unrestricted with out pain . Infant feeding: Breast feeding going well.      LATCH Score:   Latch: 2 - Good Latch  Audible Swallowin - Spontaneous & frequent  Type of Nipple: (Breast/Nipple) 2 - Everted  Comfort: 1 - Filling, small blisters, mild/mod pain  Hold: 2 - No Assist   Total LATCH Score: 9     Postpartum breastfeeding assessment completed and education provided, see Patient Education Activity.  Items included in the education are:     proper positioning and latch    effectiveness of feeding    manual expression    handling and storing breastmilk    maintenance of breastfeeding for the first 6 months    sign/symptoms of infant feeding issues requiring referral to qualified health care provider  Postpartum care education provided, see Patient Education activity. Patient denies needs. Will monitor.  Promise Huber

## 2017-03-24 NOTE — L&D DELIVERY NOTE
Delivery Summary    Shy Catherine MRN# [id number]   Age: 26 year old YOB: 1990     ASSESSMENT & PLAN:   Girl Adam  Intact      Labor Event Times    Labor onset date:  3/23/17 Onset time:   1:10 PM   Dilation complete date:  3/23/17 Complete time:   7:59 PM   Start pushing date/time:  3/23/2017 2003            Labor Length    1st Stage (hrs):  6 (min):  49   2nd Stage (hrs):  0 (min):  7   3rd Stage (hrs):  0 (min):  2      Labor Events     labor?:  No    steroids:  None   Labor Type:  Induction      Rupture identifier:  Rupture 1   Rupture date/time: 3/23/17 1310   Rupture type:  Artificial Rupture of Membranes   Fluid color:  Clear   Fluid odor:  Normal      Induction:  Misoprostol   Induction date/time:     Cervical ripening date/time:     Indications for induction:  Elective      1:1 continuous labor support provided by?:  RN          Delivery/Placenta Date and Time    Delivery Date:  3/23/17 Delivery Time:   8:06 PM   Placenta Date/Time:  3/23/2017  8:08 PM   Oxytocin given at the time of delivery:  after delivery of baby      Vaginal Counts    Initial count performed by 2 team members:   Two Team Members   Yodit Roberts LPN          Needles Suture [place] Sponges Instruments   Initial counts 2  15 10   Added to count       Final counts 2  15 10      Placed during labor Accounted for at the end of labor   NA NA   NA NA   NA NA      Final count performed by 2 team members:   Two Team Members   MICHEAL Wilson         Final count correct?:  Yes         Apgars    Living status:  Yes    1 Minute 5 Minute 10 Minute 15 Minute 20 Minute   Skin color: 1  1       Heart rate: 2  2       Reflex irritability: 2  2       Muscle tone: 2  2       Respiratory effort: 2  2       Total: 9  9          Apgars assigned by:  TARYN WILSON      Cord    Vessels:  3 Vessels Complications:  None   Cord Blood Disposition:  Lab Gases Sent?:  No          Resuscitation    Methods:  None       Lulu Care at Delivery:  Delivered viable female , froylan lion, placed on mom chest skin to skin      Skin to Skin and Feeding Plan    Skin to skin initiation date/time: 3/23/17 2010   Skin to skin with:  Mother   Skin to skin end date/time:     How do you plan to feed your baby:  Breastfeeding      Labor Events and Shoulder Dystocia    Fetal Tracing Prior to Delivery:  Category 1            Delivery (Maternal) (Provider to Complete) (323562)    Episiotomy:  None   Perineal lacerations:  None    Vaginal delivery est. blood loss (mL):  200         Mother's Information  Mother: DoreneLeonidesle ANNE #[id number]    Start of Mother's Information     IO Blood Loss  17 1310 - 17 2154    Mom's I/O Activity            End of Mother's Information  Mother: DoreneLeonidesle ANNE #[id number]            Delivery - Provider to Complete (211921)    Delivering clinician:  THIAGO PAEZ   Attempted Delivery Types (Choose all that apply):  Spontaneous Vaginal Delivery   Delivery Type (Choose the 1 that will go to the Birth History):  Vaginal, Spontaneous Delivery                           Placenta    Immediate Cord Clamping:  Done   Date/Time:  3/23/2017  8:08 PM   Removal:  Spontaneous   Disposition:  Hospital disposal      Anesthesia    Method:  Epidural, Intrathecal   Cervical dilation at placement:  4-7         Presentation and Position    Presentation:  Vertex    Occiput Anterior                    Thiago Paez MD

## 2017-03-24 NOTE — PROGRESS NOTES
March 24, 2017        DAILY NOTE - POSTPARTUM DAY 1/DC note    SUBJECTIVE: No complaints    Pain controlled? Yes  Tolerating a regular diet? YES  Ambulating? YES  Voiding without difficulty? Yes  Lochia? minimal  Breastfeeding:  yes      OBJECTIVE:  Vitals:    03/23/17 2150 03/23/17 2300 03/23/17 2301 03/24/17 0332   BP: 109/54  96/53 97/54   Pulse:       Resp:  16     Temp:  98.3  F (36.8  C)  98  F (36.7  C)   TempSrc:  Oral  Oral   SpO2:   96%        Constitutional: healthy and alert, NAD    Abdomen:  Uterine fundus is firm, non-tender and at the level of the umbilicus      Extremeties:  neg edema, non-tender    LABS:  Hemoglobin   Date Value Ref Range Status   03/24/2017 10.7 (L) 11.7 - 15.7 g/dL Final   03/23/2017 11.6 (L) 11.7 - 15.7 g/dL Final     No results found for: RUBELLAABIGG   Lab Results   Component Value Date    ABO A 03/23/2017           Lab Results   Component Value Date    RH  Pos 03/23/2017        ASSESSMENT:  Post-partum day #1  Normal spontaneous vaginal delivery  Doing well.     PLAN:   Anticipate discharge later today. Routine pp care.  Discharge to home.  Discussed meds, restrictions, follow-up.   Call prn if problems in interim.         Thiago Downey

## 2017-03-24 NOTE — ANESTHESIA PREPROCEDURE EVALUATION
Anesthesia Evaluation       history and physical reviewed .      No history of anesthetic complications          ROS/MED HX    ENT/Pulmonary:  - neg pulmonary ROS     Neurologic:  - neg neurologic ROS     Cardiovascular:  - neg cardiovascular ROS       METS/Exercise Tolerance:     Hematologic:         Musculoskeletal:         GI/Hepatic:  - neg GI/hepatic ROS       Renal/Genitourinary:         Endo:         Psychiatric:         Infectious Disease:         Malignancy:         Other:                     Physical Exam  Normal systems: cardiovascular, pulmonary and dental    Airway   Mallampati: II  TM distance: > 3 FB  Neck ROM: full  Mouth opening: > 3 cm    Dental     Cardiovascular       Pulmonary     Other findings: Platelets >100      neg OB ROS                 Anesthesia Plan      History & Physical Review      ASA Status:  .  OB Epidural Asa: 2 and emergent       Plan for     Discussed risks and benefits with patient including itching, sore back, infection, hematoma, spinal headache, CV complications, inability to place, nerve damage. Pt wishes to proceed.       Postoperative Care      Consents  Anesthetic plan, risks, benefits and alternatives discussed with:  Patient..                          .

## 2017-03-24 NOTE — ANESTHESIA POSTPROCEDURE EVALUATION
Patient: Shy Catherine    * No procedures listed *    Diagnosis:* No pre-op diagnosis entered *  Diagnosis Additional Information: No value filed.    Anesthesia Type:  No value filed.    Note:  Anesthesia Post Evaluation    Patient location during evaluation: Bedside  Patient participation: Able to fully participate in evaluation  Level of consciousness: awake and alert  Pain management: adequate  Airway patency: patent  Cardiovascular status: acceptable  Respiratory status: acceptable  Hydration status: acceptable  PONV: none     Anesthetic complications: None          Last vitals:  Vitals:    03/23/17 1741 03/23/17 1747 03/23/17 1753   BP: 111/64 116/68 115/69   Pulse: 62 80    Resp: 18 18    Temp:      SpO2: 99% 99%          Electronically Signed By: ASHLI Peterson CRNA  March 23, 2017  7:13 PM

## 2017-03-24 NOTE — PLAN OF CARE
Problem: Goal Outcome Summary  Goal: Goal Outcome Summary  Outcome: Improving  Labor Shift Note  Data: See Flowsheets activity for contraction and fetal documentation.   Vitals:     17 1738 17 1741 17 1747 17 1753   BP: 117/68 111/64 116/68 115/69   Pulse: 60 62 80 70   Resp: 18 18 18 18   Temp:       98.3  F (36.8  C)   TempSrc:       Oral   SpO2: 99% 99% 99% 98%   . Signs and symptoms of infection Present and Absent.    Complications in labor: Present and Absent. Support person significant other and mother present.  Interventions: Continue uterine/fetal assessment per orders and nursing discretion. Vital Signs per order set. Comfort measures/pain control/labor management: Currently getting Epidural placed by Justin ESTEVES  Plan: Anticipate . Provide labor/coping assistance as needed by patient and support person.  Observe for and notify care provider of indications of progressing labor, need for pain medications, or signs of fetal/maternal compromise.    Face to face report given at bedside with opportunity to observe patient.    Jeniffer DENG

## 2017-03-24 NOTE — ANESTHESIA POSTPROCEDURE EVALUATION
Patient: Shy Catherine    * No procedures listed *    Diagnosis:* No pre-op diagnosis entered *  Diagnosis Additional Information: No value filed.    Anesthesia Type:  No value filed.    Note:  Anesthesia Post Evaluation    Patient location during evaluation: Bedside  Patient participation: Able to fully participate in evaluation  Level of consciousness: awake and alert  Pain management: adequate  Airway patency: patent  Cardiovascular status: acceptable  Respiratory status: acceptable  Hydration status: acceptable  PONV: none     Anesthetic complications: None          Last vitals:  Vitals:    03/23/17 2301 03/24/17 0332 03/24/17 0745   BP: 96/53 97/54 103/60   Pulse:      Resp:   16   Temp:  98  F (36.7  C) 98.1  F (36.7  C)   SpO2: 96%  98%         Electronically Signed By: ASHLI Peterson CRNA  March 24, 2017  9:40 AM

## 2017-03-24 NOTE — PLAN OF CARE
Breastfeeding babe. Denies pain. Starting to get more feeling in legs and feed. Fundus firm. Lochia small with no clots. Bonding well with babe. Family members at bedside. Will monitor.

## 2017-03-24 NOTE — DISCHARGE INSTRUCTIONS
Pelvic rest for 4 weeks  No vigorous activity, exercise, for 2 weeks.  Then may resume normal activity as tolerated.  Schedule outpatient  lactation f/u 2-4 days prn.    Schedule Postpartum check 2 weeks Jami Baer NP and 6 weeks Dr. Downey  Call Dr. Downey [phone] as necessary if problems in interim    Vaginal Delivery or  Birth   Discharge Instructions    Activity: Go back to your normal activities    Diet: You may eat a regular diet. Drink plenty of fluids.      Call your Doctor  if you have any of these symptoms:    * You soak a sanitary pad with blood within 1 hour, or you see clots larger than a  golf ball.    * Bleeding that lasts more than 6 weeks.     *Bad-smelling fluid that comes out of your vagina.    *A fever above 100.4 degrees Fahrenheit (38.4 degrees Celsius) with or without chills.    * Severe pain, cramping, or tenderness in your lower belly area.    * Increased pain, swelling, redness or fluid around your stitches.    * A need to urinate more frequently (use the toilet more often), more urgently (use the toilet very quickly), or it burns when you urinate.    * Redness, swelling, or pain around a vein in your leg.    * Problems coping with sadness, anxiety, or depression.    * Problems breastfeeding, or a red or painful area on your breast.    * You have questions or concerns after you return home.      [facility]: [phone]

## 2017-03-24 NOTE — ANESTHESIA PROCEDURE NOTES
Peripheral nerve/Neuraxial procedure note : epidural catheter  Pre-Procedure  Performed by   Referred by PHILIPPE  Location: OB    Procedure Times:3/23/2017 7:35 PM and 3/23/2017 7:44 PM  Pre-Anesthestic Checklist: patient identified, IV checked, site marked, risks and benefits discussed, informed consent, monitors and equipment checked, pre-op evaluation and at physician/surgeon's request    Timeout  Correct Patient: Yes   Correct Procedure: Yes   Correct Site: Yes   Correct Laterality: N/A   Correct Position: Yes   Site Marked: N/A   .   Procedure Documentation    Diagnosis:labor pain.    Procedure:    Epidural catheter.  Insertion Site:L3-4  (midline approach) Injection technique: LORT saline   Local skin infiltrated with 3 mL of 1% lidocaine.  PAMELLA at 6 cm     Patient Prep;mask, sterile gloves, chlorhexidine gluconate and isopropyl alcohol, patient draped.  .  Needle: Touhy needle (18 G. 3.5 in). # of attempts: 1. # of redirects:.  Spinal Needle: . . . . .  Catheter threaded easily  4 cm epidural space.  10 cm at skin.   .    Assessment/Narrative  Paresthesias: No.  .  .  Aspiration negative for heme or CSF  . Test dose of 3 mL lidocaine 1.5% w/ 1:200,000 epinephrine at 19:35.  Test dose negative for signs of intravascular, subdural or intrathecal injection. Sensory Level Left: T8  Sensory Level Right: T8

## 2017-03-25 NOTE — PLAN OF CARE
Problem: Goal Outcome Summary  Goal: Goal Outcome Summary  Outcome: Adequate for Discharge Date Met:  03/24/17  Patient discharged at 9:18 PM via ambulation accompanied by significant other and staff. Prescriptions sent with patient to fill . All belongings sent with patient.      Discharge instructions reviewed with Josefina. Listed belongings gathered and returned to patient.      Patient discharged to home.      Core Measures and Vaccines  Core Measures applicable during stay: none  Pneumonia and Influenza given prior to discharge, if indicated: N/A     Surgical Patient   Surgical Procedures during stay: none  Did patient receive discharge instruction on wound care and recognition of infection symptoms? N/A     MISC  Follow up appointment made:  Yes  Home and hospital aquired medications returned to patient: Yes  Patient reports pain was well managed at discharge: Yes        Problem: Postpartum, Vaginal Delivery (Adult)  Goal: Signs and Symptoms of Listed Potential Problems Will be Absent or Manageable (Postpartum, Vaginal Delivery)  Signs and symptoms of listed potential problems will be absent or manageable by discharge/transition of care (reference Postpartum, Vaginal Delivery (Adult) CPG).     03/24/17 2118   Postpartum, Vaginal Delivery   Problems Assessed (Postpartum Vaginal Delivery) all   Problems Present (Postpartum Vaginal Delivery) pain

## 2017-03-31 ENCOUNTER — TELEPHONE (OUTPATIENT)
Dept: OBGYN | Facility: HOSPITAL | Age: 27
End: 2017-03-31

## 2017-03-31 NOTE — TELEPHONE ENCOUNTER
1. How was your birth experience at [facility]?  good  2. I see when you were discharged from the hospital that you were breast or formula feeding your baby. (refer to discharge phone call log) If breastfeeding, are you still exclusively breastfeeding? If not breastfeeding please complete questions 3, 4, 5, 7, 9-13 (in BOLD).  yes  3. Before your baby was born, were you educated on breastfeeding?  yes  4. Did you and your baby have skin to skin contact immediately after birth?  yes  5. Were you able to offer your baby the breast for the first time within 1 hour after birth?  yes  6. Was the staff supportive and did they educate you on breastfeeding? (Example: Feeding on demand, breastfeeding without supplementation, no pacifiers, checking baby's latch, etc.)  yes  7. Did your baby room-in with you during your hospital stay?  yes  8. Were you told how to contact someone for breastfeeding support or how to make an outpatient lactation appointment if not already done for you?  yes  9. Overall, were you happy with your experience with infant feeding and bonding with your baby?  yes  10. Did you completely understand your discharge instructions prior to leaving the hospital?  yes  11. We always want to provide exceptional care. How was your overall care?  10/10 It was phenomenal.    12. Is there anything we could have done differently to meet your needs?  no  13. Is there anything I can do before I go?  No    Thank you for your time today and for choosing [facility] for your care. We wish you and your family good health and much happiness.     Renée Black  March 31, 2017

## 2017-04-06 ENCOUNTER — TELEPHONE (OUTPATIENT)
Dept: FAMILY MEDICINE | Facility: OTHER | Age: 27
End: 2017-04-06

## 2017-04-06 DIAGNOSIS — B37.0 THRUSH: Primary | ICD-10-CM

## 2017-04-06 RX ORDER — NYSTATIN 100000/ML
SUSPENSION, ORAL (FINAL DOSE FORM) ORAL
Qty: 60 ML | Refills: 1 | Status: SHIPPED | OUTPATIENT
Start: 2017-04-06 | End: 2017-11-01

## 2017-04-06 NOTE — TELEPHONE ENCOUNTER
1:03 PM    Reason for Call: Phone Call    Description: Pt called and stated she and baby both have thrush from breast feeding. Wondering if something can be called into [facility]. Please call pt back at [phone]    Was an appointment offered for this call? No    Preferred method for responding to this message: Telephone Call    If we cannot reach you directly, may we leave a detailed response at the number you provided? Yes    Can this message wait until your PCP/provider returns, if available today? No, PCP out today and pt would like addressed today    Promise Resendez

## 2017-04-07 ENCOUNTER — PRENATAL OFFICE VISIT (OUTPATIENT)
Dept: OBGYN | Facility: OTHER | Age: 27
End: 2017-04-07
Attending: NURSE PRACTITIONER
Payer: COMMERCIAL

## 2017-04-07 VITALS
HEIGHT: 64 IN | SYSTOLIC BLOOD PRESSURE: 102 MMHG | WEIGHT: 141.6 LBS | HEART RATE: 72 BPM | BODY MASS INDEX: 24.17 KG/M2 | DIASTOLIC BLOOD PRESSURE: 60 MMHG | OXYGEN SATURATION: 98 %

## 2017-04-07 PROCEDURE — 99212 OFFICE O/P EST SF 10 MIN: CPT | Performed by: NURSE PRACTITIONER

## 2017-04-07 RX ORDER — CHLORAL HYDRATE 500 MG
2 CAPSULE ORAL DAILY
COMMUNITY
End: 2017-11-01

## 2017-04-07 ASSESSMENT — PAIN SCALES - GENERAL: PAINLEVEL: NO PAIN (0)

## 2017-04-07 NOTE — MR AVS SNAPSHOT
"              After Visit Summary   2017    Shy Catherine    MRN: [id number]           Patient Information     Date Of Birth          1990        Visit Information        Provider Department      2017 11:00 AM Jami Baer NP [facility]        Today's Diagnoses      (spontaneous vaginal delivery)          Care Instructions    BREASTFEEDING TIPS  1. Breastfeed every 2-4 hours. If your baby is sleepy - use breast compression, push on chin to \"start up\" baby, switch breasts, undress to diaper and wake before relatching.   Some babies \"cluster\" feed every 1 hour for a while- this is normal. Feed your baby whenever he/she is awake-  even if every hour for a while. This frequent feeding will help you make more milk and encourage your baby to sleep for longer stretches later in the evening or night.    - Position your baby close to you with pillows so he/she is facing you -belly to belly laying horizontally across your lap at the level of your breast and looking a bit \"upwards\" to your breast   -One hand holds the baby's neck behind the ears and the other hand holds your breast  -Baby's nose should start out pointing to your nipple before latching  - Hold your breast in a \"sandwich\" position by gently squeezing your breast in an oval shape and make sure your hands are not covering the areola  This \"nipple sandwich\" will make it easier for your breast to fit inside the baby's mouth-making latching more comfortable for you and baby and preventing sore nipples. Your baby should take a \"mouthful\" of breast!  - You may want to use hand expression to \"prime the pump\" and get a drip of milk out on your nipple to wake baby   (see website: newborns.[employer].edu/Breastfeeding/HandExpression.html)  - Swipe your nipple on baby's upper lip and wait for a BIG open mouth  - YOU bring baby to the breast (hold baby's neck with your fingers just below the ears) and bring baby's head to the breast--leading " "with the chin.  Try to avoid pushing your breast into baby's mouth- bring baby to you instead!  - Aim to get your baby's bottom lip LOW DOWN ON AREOLA (baby's upper lip just needs to \"clear\" the nipple) .   Your baby should latch onto the areola and NOT just the nipple. That way your baby gets more milk and you don't get sore nipples!      Useful web sites:  Www.infantrisk.com  Www.aap.org  Www.ibreastfeeding.com  Www.health.state.[place].us        Follow-ups after your visit        Your next 10 appointments already scheduled     May 05, 2017  1:30 PM CDT   (Arrive by 1:15 PM)   Post Partum with Thiago Downey MD   [facility]    [address]  [facility] [id number] [phone]              Who to contact     If you have questions or need follow up information about today's clinic visit or your schedule please contact [facility] directly at [phone].  Normal or non-critical lab and imaging results will be communicated to you by Force[employer], letter or phone within 4 business days after the clinic has received the results. If you do not hear from us within 7 days, please contact the clinic through MyChart or phone. If you have a critical or abnormal lab result, we will notify you by phone as soon as possible.  Submit refill requests through [employer] or call your pharmacy and they will forward the refill request to us. Please allow 3 business days for your refill to be completed.          Additional Information About Your Visit        Force[employer]uitMe Information     [employer] lets you send messages to your doctor, view your test results, renew your prescriptions, schedule appointments and more. To sign up, go to [email or web address] . Click on \"Log in\" on the left side of the screen, which will take you to the Welcome page. Then click on \"Sign up Now\" on the right side of the page.     You will be asked to enter the access code listed below, as well as some personal information. " "Please follow the directions to create your username and password.     Your access code is: [email or web address]  Expires: 2017  1:17 PM     Your access code will  in 90 days. If you need help or a new code, please call your [facility] clinic or [phone].        Care EveryWhere ID     This is your Care EveryWhere ID. This could be used by other organizations to access your [facility] medical records  [id number]        Your Vitals Were     Pulse Height Last Period Pulse Oximetry BMI (Body Mass Index)       72 5' 4\" (1.626 m) 2016 (Exact Date) 98% 24.31 kg/m2        Blood Pressure from Last 3 Encounters:   17 102/60   17 114/59   17 112/62    Weight from Last 3 Encounters:   17 141 lb 9.6 oz (64.2 kg)   17 156 lb (70.8 kg)   17 156 lb (70.8 kg)              Today, you had the following     No orders found for display       Primary Care Provider Office Phone # Fax #    Itzel VizcarraMAYA estrada [phone]       [address] [address]        Thank you!     Thank you for choosing [facility]  for your care. Our goal is always to provide you with excellent care. Hearing back from our patients is one way we can continue to improve our services. Please take a few minutes to complete the written survey that you may receive in the mail after your visit with us. Thank you!             Your Updated Medication List - Protect others around you: Learn how to safely use, store and throw away your medicines at www.disposemymeds.org.          This list is accurate as of: 17 11:16 AM.  Always use your most recent med list.                   Brand Name Dispense Instructions for use    fish oil-omega-3 fatty acids 1000 MG capsule      Take 2 g by mouth daily       METAMUCIL PO      Take 1 tablet by mouth daily       nystatin [id number] UNIT/ML suspension    MYCOSTATIN    60 mL    1 ml po to each cheek QID.  Use 48 hours past symptom resolution "       PRENATAL VITAMINS PO      Take 1 tablet by mouth daily

## 2017-04-07 NOTE — NURSING NOTE
"Chief Complaint   Patient presents with     Post Partum Exam     2 week     Lactation Consult       Initial /60  Pulse 72  Ht 5' 4\" (1.626 m)  Wt 141 lb 9.6 oz (64.2 kg)  LMP 06/21/2016 (Exact Date)  SpO2 98%  BMI 24.31 kg/m2 Estimated body mass index is 24.31 kg/(m^2) as calculated from the following:    Height as of this encounter: 5' 4\" (1.626 m).    Weight as of this encounter: 141 lb 9.6 oz (64.2 kg).  Medication Reconciliation: complete     Shy Perdomo      "

## 2017-04-07 NOTE — PROGRESS NOTES
"SUBJECTIVE:  Shy Catherine is a 26 year old female P2 here for a 2 week postpartum visit.  She had a  on 3/23/17 delivering a healthy baby girl weighing 6 lbs 14.8 oz at term.      Today's Depression Rating was No Value exists for the : HP#PHQ9    delivery complications:  No  breast feeding:  Yes, doing well.  Currently treating for thrush.  bladder problems:  No  bowel problems/hemorrhoids:  No  episiotomy/laceration/incision healed? N/A  vaginal flow:  Scant and intermittent  [place]:  No  contraception:  Depo-Provera  emotional adjustment:  doing well and happy    OBJECTIVE:  Blood pressure 102/60, pulse 72, height 5' 4\" (1.626 m), weight 141 lb 9.6 oz (64.2 kg), last menstrual period 2016, SpO2 98 %, unknown if currently breastfeeding.   General - pleasant female in no acute distress.  Breast - no nodularity, asymmetry or nipple discharge bilaterally.    Consultation Date: 2017    Reason for Lactation Referral:routine lactation assessment.    MATERNAL HISTORY   Maternal History:  at term  History of Breast Surgery: No  Breast Changes During Pregnancy: Yes  Breast Feeding History: Yes,  successful  Maternal Meds: see list    MATERNAL ASSESSMENT    Breast Size: average  Nipple Appearance - Left: intact  Nipple Appearance - Right: intact  Nipple Erectility - Left: erect with stimulation  Nipple Erectility - Right: erect with stimulation  Areolas Compressibility: soft and pliable  Nipple Size: average  Milk Supply: mature    INFANT HISTORY & ASSESSMENT    Weight  Birth weight:   Discharge weight: Weight: 141 lb 9.6 oz (64.2 kg)  Percentage wt. change from birth: Percent Weight Change Since Birth: 0    Oral Anatomy  Mouth: normal  Palate: normal  Jaw: normal  Tongue: normal  Frenulum: normal  Digital Suck Exam: root    FEEDING   Feeding Time:8 minutes  Position: modified cradle  Effort to Latch: awake and alert, latched easily  Duration of Breast Feeding: Right Breast: 8; Left Breast: " 0  Results: excellent breast feed  Volume of Intake:    Pre-Weight: 7-4    Post-Weight: 7-6    Total Intake: 2 oz    FEEDING PLAN    Home Feeding Plan: as below    LACTATION RECOMMENDATIONS AND COMMENTS     Continue to feed 8-12 times per 24 hours, Monitor positioning and latch and Call as needed with concerns or questions

## 2017-04-07 NOTE — PATIENT INSTRUCTIONS
"BREASTFEEDING TIPS  1. Breastfeed every 2-4 hours. If your baby is sleepy - use breast compression, push on chin to \"start up\" baby, switch breasts, undress to diaper and wake before relatching.   Some babies \"cluster\" feed every 1 hour for a while- this is normal. Feed your baby whenever he/she is awake-  even if every hour for a while. This frequent feeding will help you make more milk and encourage your baby to sleep for longer stretches later in the evening or night.    - Position your baby close to you with pillows so he/she is facing you -belly to belly laying horizontally across your lap at the level of your breast and looking a bit \"upwards\" to your breast   -One hand holds the baby's neck behind the ears and the other hand holds your breast  -Baby's nose should start out pointing to your nipple before latching  - Hold your breast in a \"sandwich\" position by gently squeezing your breast in an oval shape and make sure your hands are not covering the areola  This \"nipple sandwich\" will make it easier for your breast to fit inside the baby's mouth-making latching more comfortable for you and baby and preventing sore nipples. Your baby should take a \"mouthful\" of breast!  - You may want to use hand expression to \"prime the pump\" and get a drip of milk out on your nipple to wake baby   (see website: newborns.[employer].edu/Breastfeeding/HandExpression.html)  - Swipe your nipple on baby's upper lip and wait for a BIG open mouth  - YOU bring baby to the breast (hold baby's neck with your fingers just below the ears) and bring baby's head to the breast--leading with the chin.  Try to avoid pushing your breast into baby's mouth- bring baby to you instead!  - Aim to get your baby's bottom lip LOW DOWN ON AREOLA (baby's upper lip just needs to \"clear\" the nipple) .   Your baby should latch onto the areola and NOT just the nipple. That way your baby gets more milk and you don't get sore nipples!      Useful web " sites:  Www.infantrisk.com  Www.aap.org  Www.ibreastfeeding.com  Www.health.[facility].mn.us

## 2017-04-08 ASSESSMENT — PATIENT HEALTH QUESTIONNAIRE - PHQ9: SUM OF ALL RESPONSES TO PHQ QUESTIONS 1-9: 0

## 2017-05-05 ENCOUNTER — PRENATAL OFFICE VISIT (OUTPATIENT)
Dept: OBGYN | Facility: OTHER | Age: 27
End: 2017-05-05
Attending: OBSTETRICS & GYNECOLOGY
Payer: COMMERCIAL

## 2017-05-05 VITALS
OXYGEN SATURATION: 98 % | WEIGHT: 138 LBS | HEART RATE: 70 BPM | SYSTOLIC BLOOD PRESSURE: 100 MMHG | DIASTOLIC BLOOD PRESSURE: 62 MMHG | BODY MASS INDEX: 23.69 KG/M2

## 2017-05-05 PROBLEM — Z34.90 TERM PREGNANCY: Status: RESOLVED | Noted: 2017-03-23 | Resolved: 2017-05-05

## 2017-05-05 PROCEDURE — 99207 ZZC POST PARTUM EXAM: CPT | Performed by: OBSTETRICS & GYNECOLOGY

## 2017-05-05 PROCEDURE — 99212 OFFICE O/P EST SF 10 MIN: CPT | Performed by: OBSTETRICS & GYNECOLOGY

## 2017-05-05 ASSESSMENT — PAIN SCALES - GENERAL: PAINLEVEL: NO PAIN (0)

## 2017-05-05 NOTE — PROGRESS NOTES
SUBJECTIVE:  Shy Catherine is a 26 year old female P2 here for a postpartum visit.  She had a   delivering a healthy baby girl  Currently no complaints and doing well.    Today's Depression Rating was No Value exists for the : HP#PHQ9    delivery complications:  No  breast feeding:  Yes  bladder problems:  No  bowel problems/hemorrhoids:  No  episiotomy/laceration/incision healed? Yes  vaginal flow:  None  [place]:  No  contraception:  abstinence  emotional adjustment:  doing well and happy      OBJECTIVE:  Blood pressure 100/62, pulse 70, weight 138 lb (62.6 kg), last menstrual period 2016, SpO2 98 %, unknown if currently breastfeeding.   General - pleasant female in no acute distress.  Abdomen - soft, nontender, nondistended, no hepatosplenomegaly.  Pelvic - EG: normal adult female, BUS: within normal limits, Vagina: well rugated, no discharge, Cervix: no lesions or CMT, Uterus: firm, normal sized and nontender, Adnexae: no masses or tenderness.  Rectovaginal - deferred.    ASSESSMENT:  normal postpartum exam.  Released from pregnancy related restrictions    PLAN:  Discussed kegel exercises   May resume normal activities without restrictions  Pap smear Not Done today    The patient will use condoms for birth control. Full counseling was provided, and all questions answered. Compliance is strongly emphasized.  Return to clinic in one year for an annual, when due for a pap smear or PRN.    Thiago Downey

## 2017-05-05 NOTE — NURSING NOTE
"Chief Complaint   Patient presents with     Post Partum Exam      on 3/23/2017, Girl- Adam,        Initial /62 (BP Location: Left arm, Patient Position: Chair, Cuff Size: Adult Regular)  Pulse 70  Wt 138 lb (62.6 kg)  LMP 2016 (Exact Date)  SpO2 98%  BMI 23.69 kg/m2 Estimated body mass index is 23.69 kg/(m^2) as calculated from the following:    Height as of 17: 5' 4\" (1.626 m).    Weight as of this encounter: 138 lb (62.6 kg).  Medication Reconciliation: complete   Grace Wade      "

## 2017-05-05 NOTE — MR AVS SNAPSHOT
"              After Visit Summary   2017    Shy Catherine    MRN: [id number]           Patient Information     Date Of Birth          1990        Visit Information        Provider Department      2017 1:30 PM [facility]        Today's Diagnoses     Routine postpartum follow-up    -  1      Care Instructions    Return to clinic in one year for an annual, when due for a pap smear or PRN.        Follow-ups after your visit        Who to contact     If you have questions or need follow up information about today's clinic visit or your schedule please contact [facility] directly at [phone].  Normal or non-critical lab and imaging results will be communicated to you by Smartbill - Recurrence Backofficehart, letter or phone within 4 business days after the clinic has received the results. If you do not hear from us within 7 days, please contact the clinic through Smartbill - Recurrence Backofficehart or phone. If you have a critical or abnormal lab result, we will notify you by phone as soon as possible.  Submit refill requests through Jobydu or call your pharmacy and they will forward the refill request to us. Please allow 3 business days for your refill to be completed.          Additional Information About Your Visit        MyChart Information     Jobydu lets you send messages to your doctor, view your test results, renew your prescriptions, schedule appointments and more. To sign up, go to [email or web address] . Click on \"Log in\" on the left side of the screen, which will take you to the Welcome page. Then click on \"Sign up Now\" on the right side of the page.     You will be asked to enter the access code listed below, as well as some personal information. Please follow the directions to create your username and password.     Your access code is: [email or web address]  Expires: 2017  1:17 PM     Your access code will  in 90 days. If you need help or a new code, please call your [facility] or " [phone].        Care EveryWhere ID     This is your Care EveryWhere ID. This could be used by other organizations to access your [place] medical records  [id number]        Your Vitals Were     Pulse Last Period Pulse Oximetry BMI (Body Mass Index)          70 06/21/2016 (Exact Date) 98% 23.69 kg/m2         Blood Pressure from Last 3 Encounters:   05/05/17 100/62   04/07/17 102/60   03/24/17 114/59    Weight from Last 3 Encounters:   05/05/17 138 lb (62.6 kg)   04/07/17 141 lb 9.6 oz (64.2 kg)   03/21/17 156 lb (70.8 kg)              Today, you had the following     No orders found for display       Primary Care Provider Office Phone # Fax #    Itzel MAYA Ayala [phone]       [address] [address]        Thank you!     Thank you for choosing [facility]  for your care. Our goal is always to provide you with excellent care. Hearing back from our patients is one way we can continue to improve our services. Please take a few minutes to complete the written survey that you may receive in the mail after your visit with us. Thank you!             Your Updated Medication List - Protect others around you: Learn how to safely use, store and throw away your medicines at www.disposemymeds.org.          This list is accurate as of: 5/5/17  1:34 PM.  Always use your most recent med list.                   Brand Name Dispense Instructions for use    fish oil-omega-3 fatty acids 1000 MG capsule      Take 2 g by mouth daily       METAMUCIL PO      Take 1 tablet by mouth daily       nystatin 601950 UNIT/ML suspension    MYCOSTATIN    60 mL    1 ml po to each cheek QID.  Use 48 hours past symptom resolution       PRENATAL VITAMINS PO      Take 1 tablet by mouth daily

## 2017-05-06 ASSESSMENT — PATIENT HEALTH QUESTIONNAIRE - PHQ9: SUM OF ALL RESPONSES TO PHQ QUESTIONS 1-9: 1

## 2017-08-21 ENCOUNTER — OFFICE VISIT (OUTPATIENT)
Dept: OBGYN | Facility: OTHER | Age: 27
End: 2017-08-21
Attending: NURSE PRACTITIONER
Payer: COMMERCIAL

## 2017-08-21 VITALS
BODY MASS INDEX: 23.56 KG/M2 | OXYGEN SATURATION: 98 % | HEIGHT: 64 IN | HEART RATE: 66 BPM | WEIGHT: 138 LBS | DIASTOLIC BLOOD PRESSURE: 68 MMHG | SYSTOLIC BLOOD PRESSURE: 112 MMHG

## 2017-08-21 DIAGNOSIS — Z30.017 ENCOUNTER FOR INITIAL PRESCRIPTION OF IMPLANTABLE SUBDERMAL CONTRACEPTIVE: Primary | ICD-10-CM

## 2017-08-21 LAB — HCG UR QL: NEGATIVE

## 2017-08-21 PROCEDURE — 99212 OFFICE O/P EST SF 10 MIN: CPT | Mod: 25 | Performed by: NURSE PRACTITIONER

## 2017-08-21 PROCEDURE — 11981 INSERTION DRUG DLVR IMPLANT: CPT | Performed by: NURSE PRACTITIONER

## 2017-08-21 PROCEDURE — 81025 URINE PREGNANCY TEST: CPT | Mod: ZL | Performed by: NURSE PRACTITIONER

## 2017-08-21 PROCEDURE — 99214 OFFICE O/P EST MOD 30 MIN: CPT | Performed by: COUNSELOR

## 2017-08-21 ASSESSMENT — PAIN SCALES - GENERAL: PAINLEVEL: NO PAIN (0)

## 2017-08-21 NOTE — MR AVS SNAPSHOT
"              After Visit Summary   2017    Shy White    MRN: [id number]           Patient Information     Date Of Birth          1990        Visit Information        Provider Department      2017 11:00 AM Jmai Baer NP [facility]        Today's Diagnoses     Encounter for initial prescription of implantable subdermal contraceptive    -  1      Care Instructions    RTC prn          Follow-ups after your visit        Who to contact     If you have questions or need follow up information about today's clinic visit or your schedule please contact [facility] directly at [phone].  Normal or non-critical lab and imaging results will be communicated to you by Nortishart, letter or phone within 4 business days after the clinic has received the results. If you do not hear from us within 7 days, please contact the clinic through Nortishart or phone. If you have a critical or abnormal lab result, we will notify you by phone as soon as possible.  Submit refill requests through Ikanos or call your pharmacy and they will forward the refill request to us. Please allow 3 business days for your refill to be completed.          Additional Information About Your Visit        MyChart Information     Ikanos lets you send messages to your doctor, view your test results, renew your prescriptions, schedule appointments and more. To sign up, go to [email or web address] . Click on \"Log in\" on the left side of the screen, which will take you to the Welcome page. Then click on \"Sign up Now\" on the right side of the page.     You will be asked to enter the access code listed below, as well as some personal information. Please follow the directions to create your username and password.     Your access code is: [email or web address]  Expires: 2017  9:26 AM     Your access code will  in 90 days. If you need help or a new code, please call your [facility] or [phone].        Care " "EveryWhere ID     This is your Care EveryWhere ID. This could be used by other organizations to access your [place] medical records  [id number]        Your Vitals Were     Pulse Height Last Period Pulse Oximetry BMI (Body Mass Index)       66 5' 4\" (1.626 m) 07/31/2017 98% 23.69 kg/m2        Blood Pressure from Last 3 Encounters:   08/21/17 112/68   05/05/17 100/62   04/07/17 102/60    Weight from Last 3 Encounters:   08/21/17 138 lb (62.6 kg)   05/05/17 138 lb (62.6 kg)   04/07/17 141 lb 9.6 oz (64.2 kg)              We Performed the Following     ETONOGESTREL IMPLANT SYSTEM     HCG qualitative urine     INSERTION NON-BIODEGRADABLE DRUG DELIVERY IMPLANT          Today's Medication Changes          These changes are accurate as of: 8/21/17 11:59 PM.  If you have any questions, ask your nurse or doctor.               Start taking these medicines.        Dose/Directions    etonogestrel 68 MG Impl   Commonly known as:  IMPLANON/NEXPLANON   Used for:  Encounter for initial prescription of implantable subdermal contraceptive   Started by:  Jami Baer NP        Dose:  1 each   1 each (68 mg) by Subdermal route continuous   Refills:  0            Where to get your medicines      Some of these will need a paper prescription and others can be bought over the counter.  Ask your nurse if you have questions.     You don't need a prescription for these medications     etonogestrel 68 MG Impl                Primary Care Provider Office Phone # Fax #    Itzel Ayala [phone]       [address] [address]        Equal Access to Services     [facility]: Hadii naya galan Soradha, waaxda luqadaha, qaybta kaalmada nolan lorenzo idiin hayaan adeeg kharash la'aan . So [facility] [phone].    ATENCIÓN: Si habla español, tiene a zhao disposición servicios gratuitos de asistencia lingüística. Llame al [phone].    We comply with applicable federal civil rights laws and " [place] laws. We do not discriminate on the basis of race, color, national origin, age, disability sex, sexual orientation or gender identity.            Thank you!     [facility] you [facility]  [facility] your [facility]. Our goal is always to provide you with excellent care. Hearing back from our patients is one way we can continue to improve our services. Please take a few minutes to complete the written survey that you may receive in the mail after your visit with us. Thank you!             Your Updated Medication List - Protect others around you: Learn how to safely use, store and throw away your medicines at www.disposemymeds.org.          This list is accurate as of: 8/21/17 11:59 PM.  Always use your most recent med list.                   Brand Name Dispense Instructions for use Diagnosis    etonogestrel 68 MG Impl    IMPLANON/NEXPLANON     1 each (68 mg) by Subdermal route continuous    Encounter for initial prescription of implantable subdermal contraceptive       fish oil-omega-3 fatty acids 1000 MG capsule      Take 2 g by mouth daily        METAMUCIL PO      Take 1 tablet by mouth daily        nystatin [id number] UNIT/ML suspension    MYCOSTATIN    60 mL    1 ml po to each cheek QID.  Use 48 hours past symptom resolution    Thrush       PRENATAL VITAMINS PO      Take 1 tablet by mouth daily

## 2017-08-21 NOTE — NURSING NOTE
"Chief Complaint   Patient presents with     Contraception     Nexplanon Placement       Initial /68  Pulse 66  Ht 5' 4\" (1.626 m)  Wt 138 lb (62.6 kg)  LMP 07/31/2017  SpO2 98%  BMI 23.69 kg/m2 Estimated body mass index is 23.69 kg/(m^2) as calculated from the following:    Height as of this encounter: 5' 4\" (1.626 m).    Weight as of this encounter: 138 lb (62.6 kg).  Medication Reconciliation: complete     Shy Perdomo      "

## 2017-08-23 ENCOUNTER — TELEPHONE (OUTPATIENT)
Dept: FAMILY MEDICINE | Facility: OTHER | Age: 27
End: 2017-08-23

## 2017-08-23 NOTE — TELEPHONE ENCOUNTER
Called pt, states the side-effects from the birthcontrol implant does say could cause shingles.  Will go to  .

## 2017-08-23 NOTE — TELEPHONE ENCOUNTER
10:58 AM    Reason for Call: OVERBOOK    Patient is having the following symptoms: Poss shingles on back for less than 1 days. (pt stated she just had birth control put in yesterday and believes this could be related to that)    The patient is requesting an appointment for ASAP with Itzel Ayala.    Was an appointment offered for this call? Yes  If yes : Appointment type short              Date Sept    Preferred method for responding to this message: Telephone Call  What is your phone number ?  [phone]    If we cannot reach you directly, may we leave a detailed response at the number you provided? Yes    Can this message wait until your PCP/provider returns, if unavailable today? Not applicable    Promise Resendez

## 2017-08-23 NOTE — PROGRESS NOTES
CC: nexplanon insertion    HPI: Shy White is a 27 year old   who is here for nexplanon insertion.  She is currently using BCP for contraception but would like some more long term with less maintenance.  We have discussed options for long term reversible contraception including nexplanon, depo provera, IUD and she has chosen nexplanon.  She is otherwise without complaints.  Patient's last menstrual period was 2017..    ROS:  C: NEGATIVE for fever, chills, change in weight  I: NEGATIVE for worrisome rashes, moles or lesions  E: NEGATIVE for vision changes or irritation  E/M: NEGATIVE for ear, mouth and throat problems  R: NEGATIVE for significant cough or SOB  CV: NEGATIVE for chest pain, palpitations or peripheral edema  GI: NEGATIVE for nausea, abdominal pain, heartburn, or change in bowel habits  : NEGATIVE for frequency, dysuria, hematuria, vaginal discharge  M: NEGATIVE for significant arthralgias or myalgia  N: NEGATIVE for weakness, dizziness or paresthesias  E: NEGATIVE for temperature intolerance, skin/hair changes  P: NEGATIVE for changes in mood or affect    Past Medical History:   Diagnosis Date     Chronic pain      Interstitial cystitis      Ninfa-Parkinson-White (WPW) syndrome 2016       Past Surgical History:   Procedure Laterality Date     ABDOMEN SURGERY      laproscopy, endometriosis     CARDIAC SURGERY  3-2016    ablation     DILATION AND CURETTAGE  2013    Procedure: DILATION AND CURETTAGE;;  Surgeon: Latesha Camacho MD;  Location: [facility]     LAPAROSCOPY DIAGNOSTIC (GYN)  2013    Procedure: LAPAROSCOPY DIAGNOSTIC (GYN);  DIAGNOSTIC LAPAROSCOPY, Dilation & Curettage;  Surgeon: Latesha Camacho MD;  Location: [facility]     LAPAROSCOPY DIAGNOSTIC CHILD       ORTHOPEDIC SURGERY  12/17/15    hp surgery      TONSILLECTOMY       TYMPANOPLASTY CHILD       wisdom teeth resection           Family History   Problem Relation Age of Onset     Crohn Disease Mother      Arthritis  "Mother      Autoimmune Disease Mother      Coronary Artery Disease Father      Thyroid Disease Sister      Thyroid Disease Daughter           Allergies   Allergen Reactions     Azithromycin Swelling     Amoxicillin      Target lesions     Cefaclor      Erythromycin GI Disturbance     target lesions     Keflex [Cephalexin Hcl]      Penicillins      Potassium Difficulty breathing     Sulfa Drugs      Prednisone Anxiety       Current Outpatient Prescriptions   Medication Sig Dispense Refill     etonogestrel (IMPLANON/NEXPLANON) 68 MG IMPL 1 each (68 mg) by Subdermal route continuous       fish oil-omega-3 fatty acids 1000 MG capsule Take 2 g by mouth daily       nystatin (MYCOSTATIN) [id number] UNIT/ML suspension 1 ml po to each cheek QID.  Use 48 hours past symptom resolution 60 mL 1     Psyllium (METAMUCIL PO) Take 1 tablet by mouth daily       Prenatal Multivit-Min-Fe-FA (PRENATAL VITAMINS PO) Take 1 tablet by mouth daily         Social History     Social History     Marital status:      Spouse name: N/A     Number of children: N/A     Years of education: N/A     Occupational History     Not on file.     Social History Main Topics     Smoking status: Former Smoker     Packs/day: 0.50     Quit date: 7/24/2016     Smokeless tobacco: Never Used      Comment: 3-4 cigs per daydown to 2 a day     Alcohol use No     Drug use: No     Sexual activity: Not on file     Other Topics Concern     Not on file     Social History Narrative       /68  Pulse 66  Ht 5' 4\" (1.626 m)  Wt 138 lb (62.6 kg)  LMP 07/31/2017  SpO2 98%  BMI 23.69 kg/m2    Gen: Healthy appearing female in NAD  Abd: soft, NT, ND  Ext: no c/c/e.  Left arm without abnormalities.    Procedure:  After informed consent was obtained, the patient was positioned on the exam table with the left arm extended and elbow bent at 90 degree angle.  The biceps grove was identified and a jose eduardo was placed 8-10cm from the medial epicondyle of the humerus.  A second " jose eduardo was placed a few cm past the original jose eduardo as a guide.  The insertion site was then swabbed with betadine x 3.  5cc of 1% lidocaine was injected along the insertion tract.  Next the nexplanon was inserted without difficulty in the recommended fashion.  The device was removed and the implant was both visualized and palpated by myself and the patient.  A small amount of bleeding was noted at the insertion site.  A gauze and pressure wrap was applied to the insertion site.  She tolerated the procedure well.    A/P  1) Contraception, successful nexplanon insertion   We discussed signs/symptoms of infection and when to call.  We again reviewed potential side effects including irregular bleeding.  She is to call with any questions.  Otherwise, RTC prn.    YANG Odonnell    10 minutes of this 30 minute visit was spent on face to face counseling about the nexplanon, the risks/benefits, side effects and placement.

## 2017-08-25 ENCOUNTER — TELEPHONE (OUTPATIENT)
Dept: FAMILY MEDICINE | Facility: OTHER | Age: 27
End: 2017-08-25

## 2017-08-25 ENCOUNTER — TRANSFERRED RECORDS (OUTPATIENT)
Dept: HEALTH INFORMATION MANAGEMENT | Facility: CLINIC | Age: 27
End: 2017-08-25

## 2017-08-25 NOTE — TELEPHONE ENCOUNTER
Message left for patient that she is on appropriate treatment, shingles can continue to spread briefly even after medication is started.  Advised to continue plan of care, use calamine, stay comfortable, and go to UC if she is in pain or concerned.  Please check on her on Monday and see if she is doing ok.    Thank you    Itzel OLIVEIRA  [phone]

## 2017-08-25 NOTE — TELEPHONE ENCOUNTER
8:24 AM    Reason for Call: OVERBOOK    Patient is having the following symptoms:  Thinks she has shingles  for unknown/Was at the [facility] and they said she was too young for shingles.  They are now spreading  The patient is requesting an appointment for   Today   with   Itzel Ayala    Was an appointment offered for this call? No/none availableIf yes : Appointment type              Date    Preferred method for responding to this message: Telephone Call  What is your phone number ?    If we cannot reach you directly, may we leave a detailed response at the number you provided? Yes    Can this message wait until your PCP/provider returns, if unavailable today? Not applicable,     Kandice Cavanaugh

## 2017-08-25 NOTE — TELEPHONE ENCOUNTER
[facility] with pt [facility] right lower back started acyclovir 500mg tid pt states pain increased and now spreading to front side.  Please advise? Should she follow up with uc or do you want me to squeeze her in today some where. Or do you want to just prescribe some thing new?  Pamela M Lechevalier LPN

## 2017-09-28 ENCOUNTER — OFFICE VISIT (OUTPATIENT)
Dept: OBGYN | Facility: OTHER | Age: 27
End: 2017-09-28
Attending: NURSE PRACTITIONER
Payer: COMMERCIAL

## 2017-09-28 VITALS
OXYGEN SATURATION: 99 % | SYSTOLIC BLOOD PRESSURE: 110 MMHG | BODY MASS INDEX: 23.56 KG/M2 | HEART RATE: 76 BPM | HEIGHT: 64 IN | WEIGHT: 138 LBS | DIASTOLIC BLOOD PRESSURE: 64 MMHG

## 2017-09-28 DIAGNOSIS — Z30.46 NEXPLANON REMOVAL: ICD-10-CM

## 2017-09-28 DIAGNOSIS — Z30.011 ENCOUNTER FOR INITIAL PRESCRIPTION OF CONTRACEPTIVE PILLS: Primary | ICD-10-CM

## 2017-09-28 PROCEDURE — 11982 REMOVE DRUG IMPLANT DEVICE: CPT | Performed by: NURSE PRACTITIONER

## 2017-09-28 PROCEDURE — 99214 OFFICE O/P EST MOD 30 MIN: CPT | Performed by: COUNSELOR

## 2017-09-28 PROCEDURE — 99212 OFFICE O/P EST SF 10 MIN: CPT | Mod: 25 | Performed by: NURSE PRACTITIONER

## 2017-09-28 RX ORDER — LEVONORGESTREL / ETHINYL ESTRADIOL AND ETHINYL ESTRADIOL 150-30(84)
1 KIT ORAL DAILY
Qty: 91 TABLET | Refills: 3 | Status: SHIPPED | OUTPATIENT
Start: 2017-09-28 | End: 2018-10-18

## 2017-09-28 ASSESSMENT — PAIN SCALES - GENERAL: PAINLEVEL: MILD PAIN (2)

## 2017-09-28 NOTE — MR AVS SNAPSHOT
"              After Visit Summary   2017    Shy White    MRN: [id number]           Patient Information     Date Of Birth          1990        Visit Information        Provider Department      2017 1:45 PM Jami Baer NP [facility]        Today's Diagnoses     Encounter for initial prescription of contraceptive pills    -  1    Nexplanon removal          Care Instructions    Follow up as needed          Follow-ups after your visit        Who to contact     If you have questions or need follow up information about today's clinic visit or your schedule please contact [facility] directly at [phone].  Normal or non-critical lab and imaging results will be communicated to you by Pixtahart, letter or phone within 4 business days after the clinic has received the results. If you do not hear from us within 7 days, please contact the clinic through Pixtahart or phone. If you have a critical or abnormal lab result, we will notify you by phone as soon as possible.  Submit refill requests through [employer] or call your pharmacy and they will forward the refill request to us. Please allow 3 business days for your refill to be completed.          Additional Information About Your Visit        MyChart Information     [employer] lets you send messages to your doctor, view your test results, renew your prescriptions, schedule appointments and more. To sign up, go to [email or web address] . Click on \"Log in\" on the left side of the screen, which will take you to the Welcome page. Then click on \"Sign up Now\" on the right side of the page.     You will be asked to enter the access code listed below, as well as some personal information. Please follow the directions to create your username and password.     Your access code is: [email or web address]  Expires: 2017  9:26 AM     Your access code will  in 90 days. If you need help or a new code, please call your [facility] or " "[phone].        Care EveryWhere ID     This is your Care EveryWhere ID. This could be used by other organizations to access your [place] medical records  [id number]        Your Vitals Were     Pulse Height Pulse Oximetry BMI (Body Mass Index)          76 5' 4\" (1.626 m) 99% 23.69 kg/m2         Blood Pressure from Last 3 Encounters:   09/28/17 110/64   08/21/17 112/68   05/05/17 100/62    Weight from Last 3 Encounters:   09/28/17 138 lb (62.6 kg)   08/21/17 138 lb (62.6 kg)   05/05/17 138 lb (62.6 kg)              We Performed the Following     REMOVAL NON-BIODEGRADABLE DRUG DELIVERY IMPLANT          Today's Medication Changes          These changes are accurate as of: 9/28/17  2:11 PM.  If you have any questions, ask your nurse or doctor.               Start taking these medicines.        Dose/Directions    levonorgest-eth estrad 91-Day 0.15-0.03 &0.01 MG per tablet   Commonly known as:  SEASONIQUE   Used for:  Encounter for initial prescription of contraceptive pills   Started by:  Jami Baer NP        Dose:  1 tablet   Take 1 tablet by mouth daily   Quantity:  91 tablet   Refills:  3            Where to get your medicines      These medications were sent to [facility] [address] [address]  AT [facility] [address]  [address] [facility] [address]     Phone:  [phone]     levonorgest-eth estrad 91-Day 0.15-0.03 &0.01 MG per tablet                Primary Care Provider Office Phone # Fax #    Itzel Ayala [phone]       [facility] [address] [address]        Equal Access to Services     CRISTINO MENSAH AH: Lenny Nguyen, raeann mast, tan kaalmada maura, nolan jimenez. So [facility] [phone].    ATENCIÓN: Si habla español, tiene a zhao disposición servicios gratuitos de asistencia lingüística. Llame al [phone].    We comply with applicable federal civil rights laws and " [place] laws. We do not discriminate on the basis of race, color, national origin, age, disability sex, sexual orientation or gender identity.            Thank you!     Thank you for choosing [facility]  for your care. Our goal is always to provide you with excellent care. Hearing back from our patients is one way we can continue to improve our services. Please take a few minutes to complete the written survey that you may receive in the mail after your visit with us. Thank you!             Your Updated Medication List - Protect others around you: Learn how to safely use, store and throw away your medicines at www.disposemymeds.org.          This list is accurate as of: 9/28/17  2:11 PM.  Always use your most recent med list.                   Brand Name Dispense Instructions for use Diagnosis    fish oil-omega-3 fatty acids 1000 MG capsule      Take 2 g by mouth daily        levonorgest-eth estrad 91-Day 0.15-0.03 &0.01 MG per tablet    SEASONIQUE    91 tablet    Take 1 tablet by mouth daily    Encounter for initial prescription of contraceptive pills       METAMUCIL PO      Take 1 tablet by mouth daily        nystatin [id number] UNIT/ML suspension    MYCOSTATIN    60 mL    1 ml po to each cheek QID.  Use 48 hours past symptom resolution    Thrush       PRENATAL VITAMINS PO      Take 1 tablet by mouth daily

## 2017-09-28 NOTE — NURSING NOTE
"Chief Complaint   Patient presents with     Contraception     Nexplanon Removal       Initial /64  Pulse 76  Ht 5' 4\" (1.626 m)  Wt 138 lb (62.6 kg)  SpO2 99%  BMI 23.69 kg/m2 Estimated body mass index is 23.69 kg/(m^2) as calculated from the following:    Height as of this encounter: 5' 4\" (1.626 m).    Weight as of this encounter: 138 lb (62.6 kg).  Medication Reconciliation: complete     Shy Perdomo      "

## 2017-09-28 NOTE — PROGRESS NOTES
[facility]                HPI   Shy presents for removal of her Nexplanon contraceptive implant. She reports uncontrollably mood swings and aggression.  She would like to go back on Seasonique as she used this successfully in the past. She is not breastfeeding.              Medications:     Current Outpatient Prescriptions Ordered in Epic   Medication     levonorgest-eth estrad 91-Day (SEASONIQUE) 0.15-0.03 &0.01 MG per tablet     fish oil-omega-3 fatty acids 1000 MG capsule     nystatin (MYCOSTATIN) [id number] UNIT/ML suspension     Psyllium (METAMUCIL PO)     Prenatal Multivit-Min-Fe-FA (PRENATAL VITAMINS PO)     Current Facility-Administered Medications Ordered in Epic   Medication Dose Route Frequency Last Rate Last Dose     lidocaine-EPINEPHrine 1.5 %-1:191380 injection   EPIDURAL PRN   3 mL at 03/23/17 1935     fentaNYL 2 mcg/mL ropivacaine 0.2% in NS BOLUS   EPIDURAL PRN   5 mL at 03/23/17 1939                Allergies:   Azithromycin; Amoxicillin; Cefaclor; Erythromycin; Keflex [cephalexin hcl]; Penicillins; Potassium; Sulfa drugs; and Prednisone         Review of Systems:   The 5 point Review of Systems is negative other than noted in the HPI                     Physical Exam:   Procedure:  After obtaining consent from the patient the nexplanon was removed.  Her arm was prepped copiously with betadine and 1% lidocaine ~ 1 cc was injected into her arm over the nexplanon cherry.  A 0.8 cm incision was made over the previous scar and the nexplanon cherry was removed without difficulty. Pressure was placed over the incision and minimal bleeding was noted.  It was approximated with dermabond and the arm was bandaged with a kerlex guaze.  She will leave that on for at least 24 hrs.    She tolerated the procedure well.  No complications.             Assessment and Plan:   Nexplanon removal - as above.  Begin oral BCP today.  Follow up for any ongoing concerns.      YANG Odonnell  9/28/2017  2:06  PM

## 2017-10-10 ENCOUNTER — TRANSFERRED RECORDS (OUTPATIENT)
Dept: HEALTH INFORMATION MANAGEMENT | Facility: HOSPITAL | Age: 27
End: 2017-10-10

## 2017-11-01 ENCOUNTER — OFFICE VISIT (OUTPATIENT)
Dept: FAMILY MEDICINE | Facility: OTHER | Age: 27
End: 2017-11-01
Attending: NURSE PRACTITIONER
Payer: COMMERCIAL

## 2017-11-01 VITALS
DIASTOLIC BLOOD PRESSURE: 70 MMHG | HEART RATE: 60 BPM | TEMPERATURE: 98 F | RESPIRATION RATE: 14 BRPM | WEIGHT: 129 LBS | BODY MASS INDEX: 22.02 KG/M2 | SYSTOLIC BLOOD PRESSURE: 100 MMHG | HEIGHT: 64 IN

## 2017-11-01 DIAGNOSIS — Z01.818 PREOP GENERAL PHYSICAL EXAM: Primary | ICD-10-CM

## 2017-11-01 PROBLEM — Z36.89 ENCOUNTER FOR TRIAGE IN PREGNANT PATIENT: Status: RESOLVED | Noted: 2017-01-30 | Resolved: 2017-11-01

## 2017-11-01 LAB
ANION GAP SERPL CALCULATED.3IONS-SCNC: 9 MMOL/L (ref 3–14)
B-HCG SERPL-ACNC: <1 IU/L (ref 0–5)
BASOPHILS # BLD AUTO: 0 10E9/L (ref 0–0.2)
BASOPHILS NFR BLD AUTO: 0.8 %
BUN SERPL-MCNC: 12 MG/DL (ref 7–30)
CALCIUM SERPL-MCNC: 8.6 MG/DL (ref 8.5–10.1)
CHLORIDE SERPL-SCNC: 108 MMOL/L (ref 94–109)
CO2 SERPL-SCNC: 24 MMOL/L (ref 20–32)
CREAT SERPL-MCNC: 0.61 MG/DL (ref 0.52–1.04)
DIFFERENTIAL METHOD BLD: NORMAL
EOSINOPHIL # BLD AUTO: 0.6 10E9/L (ref 0–0.7)
EOSINOPHIL NFR BLD AUTO: 10.9 %
ERYTHROCYTE [DISTWIDTH] IN BLOOD BY AUTOMATED COUNT: 13.7 % (ref 10–15)
GFR SERPL CREATININE-BSD FRML MDRD: >90 ML/MIN/1.7M2
GLUCOSE SERPL-MCNC: 82 MG/DL (ref 70–99)
HCT VFR BLD AUTO: 39.1 % (ref 35–47)
HGB BLD-MCNC: 12.9 G/DL (ref 11.7–15.7)
LYMPHOCYTES # BLD AUTO: 1.9 10E9/L (ref 0.8–5.3)
LYMPHOCYTES NFR BLD AUTO: 35.5 %
MCH RBC QN AUTO: 29 PG (ref 26.5–33)
MCHC RBC AUTO-ENTMCNC: 33 G/DL (ref 31.5–36.5)
MCV RBC AUTO: 88 FL (ref 78–100)
MONOCYTES # BLD AUTO: 0.3 10E9/L (ref 0–1.3)
MONOCYTES NFR BLD AUTO: 5.6 %
NEUTROPHILS # BLD AUTO: 2.5 10E9/L (ref 1.6–8.3)
NEUTROPHILS NFR BLD AUTO: 47.2 %
PLATELET # BLD AUTO: 310 10E9/L (ref 150–450)
POTASSIUM SERPL-SCNC: 3.7 MMOL/L (ref 3.4–5.3)
RBC # BLD AUTO: 4.45 10E12/L (ref 3.8–5.2)
SODIUM SERPL-SCNC: 141 MMOL/L (ref 133–144)
WBC # BLD AUTO: 5.3 10E9/L (ref 4–11)

## 2017-11-01 PROCEDURE — 85025 COMPLETE CBC W/AUTO DIFF WBC: CPT | Mod: ZL | Performed by: NURSE PRACTITIONER

## 2017-11-01 PROCEDURE — 99213 OFFICE O/P EST LOW 20 MIN: CPT

## 2017-11-01 PROCEDURE — 99214 OFFICE O/P EST MOD 30 MIN: CPT | Mod: 25 | Performed by: NURSE PRACTITIONER

## 2017-11-01 PROCEDURE — 93005 ELECTROCARDIOGRAM TRACING: CPT

## 2017-11-01 PROCEDURE — 80048 BASIC METABOLIC PNL TOTAL CA: CPT | Mod: ZL | Performed by: NURSE PRACTITIONER

## 2017-11-01 PROCEDURE — 36415 COLL VENOUS BLD VENIPUNCTURE: CPT | Mod: ZL | Performed by: NURSE PRACTITIONER

## 2017-11-01 PROCEDURE — 84702 CHORIONIC GONADOTROPIN TEST: CPT | Mod: ZL | Performed by: NURSE PRACTITIONER

## 2017-11-01 PROCEDURE — 93010 ELECTROCARDIOGRAM REPORT: CPT | Performed by: INTERNAL MEDICINE

## 2017-11-01 ASSESSMENT — PATIENT HEALTH QUESTIONNAIRE - PHQ9: SUM OF ALL RESPONSES TO PHQ QUESTIONS 1-9: 2

## 2017-11-01 ASSESSMENT — ANXIETY QUESTIONNAIRES
GAD7 TOTAL SCORE: 6
4. TROUBLE RELAXING: SEVERAL DAYS
7. FEELING AFRAID AS IF SOMETHING AWFUL MIGHT HAPPEN: SEVERAL DAYS
2. NOT BEING ABLE TO STOP OR CONTROL WORRYING: SEVERAL DAYS
6. BECOMING EASILY ANNOYED OR IRRITABLE: SEVERAL DAYS
IF YOU CHECKED OFF ANY PROBLEMS ON THIS QUESTIONNAIRE, HOW DIFFICULT HAVE THESE PROBLEMS MADE IT FOR YOU TO DO YOUR WORK, TAKE CARE OF THINGS AT HOME, OR GET ALONG WITH OTHER PEOPLE: SOMEWHAT DIFFICULT
3. WORRYING TOO MUCH ABOUT DIFFERENT THINGS: SEVERAL DAYS
1. FEELING NERVOUS, ANXIOUS, OR ON EDGE: SEVERAL DAYS
5. BEING SO RESTLESS THAT IT IS HARD TO SIT STILL: NOT AT ALL

## 2017-11-01 NOTE — NURSING NOTE
"Chief Complaint   Patient presents with     Pre-Op Exam     , removal of hip hardware, [facility], 11-8-17       Initial /70 (BP Location: Right arm, Patient Position: Sitting, Cuff Size: Adult Regular)  Pulse 60  Temp 98  F (36.7  C)  Resp 14  Ht 5' 4\" (1.626 m)  Wt 129 lb (58.5 kg)  LMP 09/25/2017  BMI 22.14 kg/m2 Estimated body mass index is 22.14 kg/(m^2) as calculated from the following:    Height as of this encounter: 5' 4\" (1.626 m).    Weight as of this encounter: 129 lb (58.5 kg).  Medication Reconciliation: complete     Cristina Burnett      "

## 2017-11-01 NOTE — MR AVS SNAPSHOT
After Visit Summary   11/1/2017    Shy White    MRN: [id number]           Patient Information     Date Of Birth          1990        Visit Information        Provider Department      11/1/2017 8:30 AM Itzel Ayala NP [facility]        Today's Diagnoses     Preop general physical exam    -  1      Care Instructions      Before Your Surgery      Call your surgeon if there is any change in your health. This includes signs of a cold or flu (such as a sore throat, runny nose, cough, rash or fever).    Do not smoke, drink alcohol or take over the counter medicine (unless your surgeon or primary care doctor tells you to) for the 24 hours before and after surgery.    If you take prescribed drugs: Follow your doctor s orders about which medicines to take and which to stop until after surgery.    Eating and drinking prior to surgery: follow the instructions from your surgeon    Take a shower or bath the night before surgery. Use the soap your surgeon gave you to gently clean your skin. If you do not have soap from your surgeon, use your regular soap. Do not shave or scrub the surgery site.  Wear clean pajamas and have clean sheets on your bed.           Follow-ups after your visit        Who to contact     If you have questions or need follow up information about today's clinic visit or your schedule please contact [facility] directly at [phone].  Normal or non-critical lab and imaging results will be communicated to you by MyChart, letter or phone within 4 business days after the clinic has received the results. If you do not hear from us within 7 days, please contact the clinic through MyChart or phone. If you have a critical or abnormal lab result, we will notify you by phone as soon as possible.  Submit refill requests through [employer] or call your pharmacy and they will forward the refill request to us. Please allow 3 business days for your refill to be completed.  "         Additional Information About Your Visit        MyChart Information     [employer] lets you send messages to your doctor, view your test results, renew your prescriptions, schedule appointments and more. To sign up, go to [email or web address] . Click on \"Log in\" on the left side of the screen, which will take you to the Welcome page. Then click on \"Sign up Now\" on the right side of the page.     You will be asked to enter the access code listed below, as well as some personal information. Please follow the directions to create your username and password.     Your access code is: [email or web address]  Expires: 2017  9:26 AM     Your access code will  in 90 days. If you need help or a new code, please call your [place] clinic or [phone].        Care EveryWhere ID     This is your [facility] EveryWhere ID. This could be used by other organizations to access your [place] medical records  [id number]        Your Vitals Were     Pulse Temperature Respirations Height Last Period BMI (Body Mass Index)    60 98  F (36.7  C) 14 5' 4\" (1.626 m) 2017 22.14 kg/m2       Blood Pressure from Last 3 Encounters:   17 100/70   17 110/64   17 112/68    Weight from Last 3 Encounters:   17 129 lb (58.5 kg)   17 138 lb (62.6 kg)   17 138 lb (62.6 kg)              We Performed the Following     Basic metabolic panel     CBC with platelets differential     EKG 12-lead complete w/read - (Clinic Performed)     HCG quantitative pregnancy          Today's Medication Changes          These changes are accurate as of: 17  9:08 AM.  If you have any questions, ask your nurse or doctor.               Stop taking these medicines if you haven't already. Please contact your care team if you have questions.     fish oil-omega-3 fatty acids 1000 MG capsule   Stopped by:  Itzel Ayala NP           METAMUCIL PO   Stopped by:  Itzel Ayala NP           nystatin [id number] UNIT/ML suspension   Commonly " known as:  MYCOSTATIN   Stopped by:  Itzel Ayala NP           PRENATAL VITAMINS PO   Stopped by:  Itzel Ayala NP                    Primary Care Provider Office Phone # Fax #    Itzel Ayala [phone]       [address] [address]        Equal Access to Services     [facility] RODRICK : Hadii aad ku hadasho Soomaali, waaxda luqadaha, qaybta kaalmada adeegyada, waxay waynein hayaan adenikki khmargauxseferino laalfred . So [facility] [phone].    ATENCIÓN: Si habla español, tiene a zhao disposición servicios gratuitos de asistencia lingüística. Llame al [phone].    We comply with applicable federal civil rights laws and [place] laws. We do not discriminate on the basis of race, color, national origin, age, disability, sex, sexual orientation, or gender identity.            Thank you!     Thank you for choosing [facility]  for your care. Our goal is always to provide you with excellent care. Hearing back from our patients is one way we can continue to improve our services. Please take a few minutes to complete the written survey that you may receive in the mail after your visit with us. Thank you!             Your Updated Medication List - Protect others around you: Learn how to safely use, store and throw away your medicines at www.disposemymeds.org.          This list is accurate as of: 11/1/17  9:08 AM.  Always use your most recent med list.                   Brand Name Dispense Instructions for use Diagnosis    levonorgest-eth estrad 91-Day 0.15-0.03 &0.01 MG per tablet    SEASONIQUE    91 tablet    Take 1 tablet by mouth daily    Encounter for initial prescription of contraceptive pills

## 2017-11-01 NOTE — PROGRESS NOTES
[facility]  [address]  [facility] [address]  [phone]  Dept: [phone]    PRE-OP EVALUATION:  Today's date: 2017    Shy White (: 1990) presents for pre-operative evaluation assessment as requested by Dr. Lee.  She requires evaluation and anesthesia risk assessment prior to undergoing surgery/procedure for treatment of hip pain .    Proposed procedure:   Removal of right proximal femoral plate.      Date of Surgery/ Procedure: 2017  Time of Surgery/ Procedure:  To be determined  Hospital/Surgical Facility: [facility]  Primary Physician: Itzel Ayala  Type of Anesthesia Anticipated: to be determined  Patient has a Health Care Directive or Living Will:  NO      1. YES - Do you have a history of heart attack, stroke, stent, bypass or surgery on an artery in the head, neck, heart or legs? - Ablation for WPW 2 years ago  2. NO - Do you ever have any pain or discomfort in your chest?  3. NO - Do you have a history of  Heart Failure?  4. NO - Are you troubled by shortness of breath when: walking on the level, up a slight hill or at night?  5. NO - Do you currently have a cold, bronchitis or other respiratory infection?  6. NO - Do you have a cough, shortness of breath or wheezing?  7. NO - Do you sometimes get pains in the calves of your legs when you walk?  8. NO - Do you or anyone in your family have previous history of blood clots?  9. NO - Do you or does anyone in your family have a serious bleeding problem such as prolonged bleeding following surgeries or cuts?  10. NO - Have you ever had problems with anemia or been told to take iron pills?  11. NO - Have you had any abnormal blood loss such as black, tarry or bloody stools, or abnormal vaginal bleeding?  12. NO - Have you ever had a blood transfusion?  13. NO - Have you or any of your relatives ever had problems with anesthesia?  14. NO - Do you have sleep apnea, excessive  snoring or daytime drowsiness?  15. NO - Do you have any prosthetic heart valves?  16. NO - Do you have prosthetic joints?  17. NO - Is there any chance that you may be pregnant?        HPI:                                                      Brief HPI related to upcoming procedure:   Patient is 2 years S/P derotational proximal femoral osteotomy with Synthes locking plate. Rotational realignment has gone well. She has developed pain over the lateral aspect of her hip to the point that she would like hardware removal.       At the time of her pre-op for the original surgery 2 years ago, WPW was noted, ans she underwent successful cardiac ablation with no ongoing concerns.    See problem list for active medical problems.  Problems all longstanding and stable, except as noted/documented.  See ROS for pertinent symptoms related to these conditions.                                                                                                  .    MEDICAL HISTORY:                                                    Patient Active Problem List    Diagnosis Date Noted     Encounter for triage in pregnant patient 01/30/2017     Priority: Medium     ACP (advance care planning) 05/11/2016     Priority: Medium     Advance Care Planning 5/11/2016: ACP Review of Chart / Resources Provided:  Reviewed chart for advance care plan.  Shy Catherine has no plan or code status on file. Discussed available resources and provided with information. Confirmed code status reflects current choices pending further ACP discussions.  Confirmed/documented legally designated decision makers.  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     Priority: Medium     Overview:   Clumsiness r/t intoeing       Acquired deformity of toe  05/27/2005     Priority: Medium     Overview:   Bilateral intoeing since onset of walking, right worse than left       Congenital deformity of hip joint 05/27/2005     Priority: Medium     Overview:   Excessive femoral anteversion bilaterally, resulting in intoeing       Intractable migraine 05/27/2005     Priority: Medium     Overview:   IMO Update        Past Medical History:   Diagnosis Date     Chronic pain      Interstitial cystitis      Ninfa-Parkinson-White (WPW) syndrome 2/29/2016     Past Surgical History:   Procedure Laterality Date     ABDOMEN SURGERY  2014    laproscopy, endometriosis     CARDIAC SURGERY  3-2016    ablation     DILATION AND CURETTAGE  7/18/2013    Procedure: DILATION AND CURETTAGE;;  Surgeon: Latesha Camacho MD;  Location: [facility]     LAPAROSCOPY DIAGNOSTIC (GYN)  7/18/2013    Procedure: LAPAROSCOPY DIAGNOSTIC (GYN);  DIAGNOSTIC LAPAROSCOPY, Dilation & Curettage;  Surgeon: Latesha Camacho MD;  Location: [facility]     LAPAROSCOPY DIAGNOSTIC CHILD       ORTHOPEDIC SURGERY  12/17/15    hp surgery      TONSILLECTOMY       TYMPANOPLASTY CHILD       wisdom teeth resection       Current Outpatient Prescriptions   Medication Sig Dispense Refill     levonorgest-eth estrad 91-Day (SEASONIQUE) 0.15-0.03 &0.01 MG per tablet Take 1 tablet by mouth daily 91 tablet 3     OTC products: None, except as noted above    Allergies   Allergen Reactions     Azithromycin Swelling     Amoxicillin      Target lesions     Cefaclor      Erythromycin GI Disturbance     target lesions     Keflex [Cephalexin Hcl]      Penicillins      Potassium Difficulty breathing     Sulfa Drugs      Prednisone Anxiety      Latex Allergy: NO    Social History   Substance Use Topics     Smoking status: Former Smoker     Packs/day: 0.50     Quit date: 7/24/2016     Smokeless tobacco: Never Used      Comment: 3-4 cigs per daydown to 2 a day     Alcohol use No     History   Drug Use No       REVIEW OF SYSTEMS:                                     "                C: NEGATIVE for fever, chills, change in weight  I: NEGATIVE for worrisome rashes, moles or lesions  E: NEGATIVE for vision changes or irritation  E/M: NEGATIVE for ear, mouth and throat problems  R: NEGATIVE for significant cough or SOB  CV: NEGATIVE for chest pain, palpitations or peripheral edema  GI: NEGATIVE for nausea, abdominal pain, heartburn, or change in bowel habits  : NEGATIVE for frequency, dysuria, or hematuria  MUSCULOSKELETAL:Right hip pain  N: NEGATIVE for weakness, dizziness or paresthesias  E: NEGATIVE for temperature intolerance, skin/hair changes  H: NEGATIVE for bleeding problems  P: NEGATIVE for changes in mood or affect    EXAM:                                                    /70 (BP Location: Right arm, Patient Position: Sitting, Cuff Size: Adult Regular)  Pulse 60  Temp 98  F (36.7  C)  Resp 14  Ht 5' 4\" (1.626 m)  Wt 129 lb (58.5 kg)  LMP 09/25/2017  BMI 22.14 kg/m2         GENERAL APPEARANCE: healthy, alert and no distress     EYES: EOMI, PERRL     HENT: ear canals and TM's normal and nose and mouth without ulcers or lesions     NECK: no adenopathy, no asymmetry, masses, or scars and thyroid normal to palpation     RESP: lungs clear to auscultation - no rales, rhonchi or wheezes     CV: regular rates and rhythm, normal S1 S2, no S3 or S4 and no murmur, click or rub     SKIN: no suspicious lesions or rashes     NEURO: Normal strength and tone, sensory exam grossly normal, mentation intact and speech normal     PSYCH: mentation appears normal. and affect normal/bright    DIAGNOSTICS:                                                      EKG attached    Results for orders placed or performed in visit on 11/01/17 (from the past 24 hour(s))   HCG quantitative pregnancy   Result Value Ref Range    HCG Quantitative Serum <1 0 - 5 IU/L   CBC with platelets differential   Result Value Ref Range    WBC 5.3 4.0 - 11.0 10e9/L    RBC Count 4.45 3.8 - 5.2 10e12/L    " Hemoglobin 12.9 11.7 - 15.7 g/dL    Hematocrit 39.1 35.0 - 47.0 %    MCV 88 78 - 100 fl    MCH 29.0 26.5 - 33.0 pg    MCHC 33.0 31.5 - 36.5 g/dL    RDW 13.7 10.0 - 15.0 %    Platelet Count 310 150 - 450 10e9/L    Diff Method Automated Method     % Neutrophils 47.2 %    % Lymphocytes 35.5 %    % Monocytes 5.6 %    % Eosinophils 10.9 %    % Basophils 0.8 %    Absolute Neutrophil 2.5 1.6 - 8.3 10e9/L    Absolute Lymphocytes 1.9 0.8 - 5.3 10e9/L    Absolute Monocytes 0.3 0.0 - 1.3 10e9/L    Absolute Eosinophils 0.6 0.0 - 0.7 10e9/L    Absolute Basophils 0.0 0.0 - 0.2 10e9/L   Basic metabolic panel   Result Value Ref Range    Sodium 141 133 - 144 mmol/L    Potassium 3.7 3.4 - 5.3 mmol/L    Chloride 108 94 - 109 mmol/L    Carbon Dioxide 24 20 - 32 mmol/L    Anion Gap 9 3 - 14 mmol/L    Glucose 82 70 - 99 mg/dL    Urea Nitrogen 12 7 - 30 mg/dL    Creatinine 0.61 0.52 - 1.04 mg/dL    GFR Estimate >90 >60 mL/min/1.7m2    GFR Estimate If Black >90 >60 mL/min/1.7m2    Calcium 8.6 8.5 - 10.1 mg/dL             IMPRESSION:                                                    Reason for surgery/procedure:   Removal of Right proximal femoral locking plate      The proposed surgical procedure is considered LOW risk.    REVISED CARDIAC RISK INDEX  The patient has the following serious cardiovascular risks for perioperative complications such as (MI, PE, VFib and 3  AV Block):  No serious cardiac risks  INTERPRETATION: 0 risks: Class I (very low risk - 0.4% complication rate)    The patient has the following additional risks for perioperative complications:  No identified additional risks        RECOMMENDATIONS:                                                          APPROVAL GIVEN to proceed with proposed procedure, without further diagnostic evaluation       Signed Electronically by: Itzel Ayala NP    Copy of this evaluation report is provided to requesting physician.    [facility] Preop Guidelines

## 2017-11-02 ENCOUNTER — TELEPHONE (OUTPATIENT)
Dept: FAMILY MEDICINE | Facility: OTHER | Age: 27
End: 2017-11-02

## 2017-11-02 ASSESSMENT — ANXIETY QUESTIONNAIRES: GAD7 TOTAL SCORE: 6

## 2017-11-02 NOTE — TELEPHONE ENCOUNTER
Faxed PreOp ECG & Labs to Dr. Lee, [facility]  Ph. [phone]  Fx. [phone]

## 2017-11-08 ENCOUNTER — TRANSFERRED RECORDS (OUTPATIENT)
Dept: HEALTH INFORMATION MANAGEMENT | Facility: CLINIC | Age: 27
End: 2017-11-08

## 2017-11-14 ENCOUNTER — TRANSFERRED RECORDS (OUTPATIENT)
Dept: HEALTH INFORMATION MANAGEMENT | Facility: HOSPITAL | Age: 27
End: 2017-11-14

## 2017-11-29 ENCOUNTER — TELEPHONE (OUTPATIENT)
Dept: FAMILY MEDICINE | Facility: OTHER | Age: 27
End: 2017-11-29

## 2017-11-29 ENCOUNTER — TRANSFERRED RECORDS (OUTPATIENT)
Dept: HEALTH INFORMATION MANAGEMENT | Facility: HOSPITAL | Age: 27
End: 2017-11-29

## 2017-11-29 NOTE — TELEPHONE ENCOUNTER
1:01 PM    Reason for Call: Phone Call    Description: Pt called and states that she would like a call back regarding her and her surgery she had had last week she states that she is having complications from this since last night and she can not get a hold of the Dr that had done her surgery she would like a call back    Was an appointment offered for this call? No  If yes : Appointment type              Date    Preferred method for responding to this message: Telephone Call  What is your phone number ?    If we cannot reach you directly, may we leave a detailed response at the number you provided? Yes    Can this message wait until your PCP/provider returns, if available today? Not applicable, PCP is in     Carmen Meza

## 2017-11-29 NOTE — TELEPHONE ENCOUNTER
Called pt, reports she had surgery with , unable to get ahold of him. Says she's unable to move her leg.  Suggested she go to the ER and they will get ahold of Rosa.

## 2017-12-01 ENCOUNTER — TRANSFERRED RECORDS (OUTPATIENT)
Dept: HEALTH INFORMATION MANAGEMENT | Facility: HOSPITAL | Age: 27
End: 2017-12-01

## 2017-12-05 ENCOUNTER — TRANSFERRED RECORDS (OUTPATIENT)
Dept: HEALTH INFORMATION MANAGEMENT | Facility: HOSPITAL | Age: 27
End: 2017-12-05

## 2017-12-12 ENCOUNTER — TRANSFERRED RECORDS (OUTPATIENT)
Dept: HEALTH INFORMATION MANAGEMENT | Facility: HOSPITAL | Age: 27
End: 2017-12-12

## 2017-12-15 ENCOUNTER — TRANSFERRED RECORDS (OUTPATIENT)
Dept: HEALTH INFORMATION MANAGEMENT | Facility: CLINIC | Age: 27
End: 2017-12-15

## 2017-12-19 ENCOUNTER — TRANSFERRED RECORDS (OUTPATIENT)
Dept: HEALTH INFORMATION MANAGEMENT | Facility: CLINIC | Age: 27
End: 2017-12-19

## 2018-01-29 ENCOUNTER — TRANSFERRED RECORDS (OUTPATIENT)
Dept: HEALTH INFORMATION MANAGEMENT | Facility: CLINIC | Age: 28
End: 2018-01-29

## 2018-02-12 ENCOUNTER — TRANSFERRED RECORDS (OUTPATIENT)
Dept: HEALTH INFORMATION MANAGEMENT | Facility: CLINIC | Age: 28
End: 2018-02-12

## 2018-04-09 ENCOUNTER — OFFICE VISIT (OUTPATIENT)
Dept: FAMILY MEDICINE | Facility: OTHER | Age: 28
End: 2018-04-09
Attending: NURSE PRACTITIONER
Payer: COMMERCIAL

## 2018-04-09 VITALS
HEART RATE: 84 BPM | TEMPERATURE: 98.2 F | RESPIRATION RATE: 14 BRPM | DIASTOLIC BLOOD PRESSURE: 70 MMHG | WEIGHT: 131.8 LBS | HEIGHT: 64 IN | SYSTOLIC BLOOD PRESSURE: 110 MMHG | BODY MASS INDEX: 22.5 KG/M2 | OXYGEN SATURATION: 100 %

## 2018-04-09 DIAGNOSIS — R11.0 NAUSEA: ICD-10-CM

## 2018-04-09 DIAGNOSIS — I45.6 WOLFF-PARKINSON-WHITE SYNDROME: ICD-10-CM

## 2018-04-09 DIAGNOSIS — M25.511 BILATERAL SHOULDER PAIN, UNSPECIFIED CHRONICITY: ICD-10-CM

## 2018-04-09 DIAGNOSIS — Q79.60 EDS (EHLERS-DANLOS SYNDROME): Primary | ICD-10-CM

## 2018-04-09 DIAGNOSIS — M25.512 BILATERAL SHOULDER PAIN, UNSPECIFIED CHRONICITY: ICD-10-CM

## 2018-04-09 LAB
BASOPHILS # BLD AUTO: 0 10E9/L (ref 0–0.2)
BASOPHILS NFR BLD AUTO: 0.3 %
DIFFERENTIAL METHOD BLD: NORMAL
EOSINOPHIL # BLD AUTO: 0.1 10E9/L (ref 0–0.7)
EOSINOPHIL NFR BLD AUTO: 1.2 %
ERYTHROCYTE [DISTWIDTH] IN BLOOD BY AUTOMATED COUNT: 13.5 % (ref 10–15)
ERYTHROCYTE [SEDIMENTATION RATE] IN BLOOD BY WESTERGREN METHOD: 7 MM/H (ref 0–20)
HCT VFR BLD AUTO: 38 % (ref 35–47)
HGB BLD-MCNC: 12.7 G/DL (ref 11.7–15.7)
LYMPHOCYTES # BLD AUTO: 2.9 10E9/L (ref 0.8–5.3)
LYMPHOCYTES NFR BLD AUTO: 30.3 %
MCH RBC QN AUTO: 28.5 PG (ref 26.5–33)
MCHC RBC AUTO-ENTMCNC: 33.4 G/DL (ref 31.5–36.5)
MCV RBC AUTO: 85 FL (ref 78–100)
MONOCYTES # BLD AUTO: 0.5 10E9/L (ref 0–1.3)
MONOCYTES NFR BLD AUTO: 4.7 %
NEUTROPHILS # BLD AUTO: 6.1 10E9/L (ref 1.6–8.3)
NEUTROPHILS NFR BLD AUTO: 63.5 %
PLATELET # BLD AUTO: 335 10E9/L (ref 150–450)
RBC # BLD AUTO: 4.45 10E12/L (ref 3.8–5.2)
WBC # BLD AUTO: 9.7 10E9/L (ref 4–11)

## 2018-04-09 PROCEDURE — 99215 OFFICE O/P EST HI 40 MIN: CPT | Performed by: NURSE PRACTITIONER

## 2018-04-09 PROCEDURE — 85025 COMPLETE CBC W/AUTO DIFF WBC: CPT | Performed by: NURSE PRACTITIONER

## 2018-04-09 PROCEDURE — 85652 RBC SED RATE AUTOMATED: CPT | Performed by: NURSE PRACTITIONER

## 2018-04-09 PROCEDURE — 36415 COLL VENOUS BLD VENIPUNCTURE: CPT | Performed by: NURSE PRACTITIONER

## 2018-04-09 PROCEDURE — 86140 C-REACTIVE PROTEIN: CPT | Performed by: NURSE PRACTITIONER

## 2018-04-09 RX ORDER — ONDANSETRON 4 MG/1
TABLET, FILM COATED ORAL
Qty: 30 TABLET | Refills: 0 | Status: SHIPPED | OUTPATIENT
Start: 2018-04-09 | End: 2019-06-03

## 2018-04-09 ASSESSMENT — ANXIETY QUESTIONNAIRES
7. FEELING AFRAID AS IF SOMETHING AWFUL MIGHT HAPPEN: NOT AT ALL
IF YOU CHECKED OFF ANY PROBLEMS ON THIS QUESTIONNAIRE, HOW DIFFICULT HAVE THESE PROBLEMS MADE IT FOR YOU TO DO YOUR WORK, TAKE CARE OF THINGS AT HOME, OR GET ALONG WITH OTHER PEOPLE: NOT DIFFICULT AT ALL
GAD7 TOTAL SCORE: 2
1. FEELING NERVOUS, ANXIOUS, OR ON EDGE: NOT AT ALL
2. NOT BEING ABLE TO STOP OR CONTROL WORRYING: NOT AT ALL
6. BECOMING EASILY ANNOYED OR IRRITABLE: NOT AT ALL
5. BEING SO RESTLESS THAT IT IS HARD TO SIT STILL: NOT AT ALL
3. WORRYING TOO MUCH ABOUT DIFFERENT THINGS: NOT AT ALL

## 2018-04-09 ASSESSMENT — PATIENT HEALTH QUESTIONNAIRE - PHQ9: 5. POOR APPETITE OR OVEREATING: MORE THAN HALF THE DAYS

## 2018-04-09 ASSESSMENT — PAIN SCALES - GENERAL: PAINLEVEL: SEVERE PAIN (6)

## 2018-04-09 NOTE — Clinical Note
I referred her to Dr Beck - please get notes from [facility], and all notes from [place] from January (not everything is blown into Epic) Also, will need [facility] from [facility].

## 2018-04-09 NOTE — Clinical Note
Referring to [facility] care.  She was at [facility] in January, Dx with Praneeth danlos syndrome.  Needs a plan, I put in a rheumatology referral.  WPW noted during pre-op a couple years ago, she had ablation and was fine.  Apparently was in UC recently, EKG showed WPW rhythm, her cardiologist at [facility] was notified and she was to FU with us.  I have requested all [facility] notes, [facility] notes, and [facility] cardio notes. (cardiologist is Dr Cota)

## 2018-04-09 NOTE — PROGRESS NOTES
SUBJECTIVE:   Shy White is a 27 year old female who presents to clinic today for the following health issues:          [facility] visit  1/29/18  Diagnosed with Praneeth Danlos Syndrome         Patient went to [facility] in January, and saw a sports medicine MD, she was tested and ultimately diagnosed with Praneeth Danlos Syndrome. It was recommended that she see a genetic counselor, and have a therapy plan to address this.        Also, she has had WPW - S/P ablation, She was seen at [facility], diagnosed again with WPW, Faina communicated with cardiology - and it is possible that she has re-developed WPW due to connective tissue disorder.     Cardiologist - [facility]    Over recent days she has developed rib pain, anterior and posterior, and bilateral shoulder pain.      Kaushik Bearden M.D. - 01/29/2018 9:24 AM CST  DEMOGRAPHIC INFORMATION  Clinic Number: [id number]  Patient Name: Mrs. Shy White  Age: 27 Y  Birthdate: 1990 Sex: F  Address: [address] [place] [address] City: [place] [address]    Service Date/Time: 29-Jan-2018 09:24  Provider: Kaushik Bearden MD Pager: [phone]  Service: SPMED Type/Desc: SE Status: Fnl Revision #: [id number]    REFERRAL  Ricardo Blanchard ([phone])    CHIEF COMPLAINT/PURPOSE OF VISIT  The patient is a 27-year-old female who is self-referred for the evaluation of right hip pain.    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 She underwent a derotational proximal femoral osteotomy in 2015, in [place] by Dr. Garcia, and subsequent hardware removal in November 2017. She has persistent groin pain. She grabs the hip in a classic C sign. She is now at a point where she feels completely disabled with regard to the hip. She has been limping. She has a snapping sensation that actually alleviates some of her pain, but the minute it happens she feels that she needs to do it is some more. She rates the pain at about a 5 to 8/10, depending on  activities, but standing in particular and walking, any kind of activity is really hard for her. She feels the hip is going to buckle. She has had extensive physical therapy to no avail. The only relief she got was during an MR arthrogram when the gadolinium was in the hip, the hip felt completely normal to her. I reviewed the actual injection notes from the physician, and no intraarticular lidocaine or ropivacaine was utilized, just the gadolinium.    The patient notes no constitutional symptoms or paresthesias.    CURRENT MEDICATIONS  No patient medications recorded as of Monday, 29-Jan-2018 at 10:31.    SYSTEMS REVIEW  PAIN SCALE  Patient's pain was reported using the numeric pain scale. Patient/caregiver rates best pain at 6/10. Additional pain site(s): worst pain 8/10    PAST MEDICAL/SURGICAL HISTORY  Ablation for heart arrhythmia, tonsillectomy, and a history of interstitial cystitis. She is  with two children. 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    PHYSICAL EXAMINATION  General: On examination, she is a healthy-appearing 27-year-old female. She is alert and oriented times three in nn no acute distress.  Right hip: Examination of the right hip shows well-healed incision. No signs of infection. Normal sensation and pulses. She has a positive Trendelenburg sign. She walks with an antalgic gait. She has a fairly deconditioned lower extremity. Even hip flexion has a little weakness. Examination of the hip itself shows flexion to 130 external 50, internal 40, abduction 45, adduction 45 with positive flexion impingement, and a positive but pain-free snapping hip. In fact, this alleviates her pain.   Left hip: Range of motion flexion 130, external 75, internal 65, abduction 60, adduction 20. She has one of the largest arc of motions on the contralateral leg that I have seen.    IMPRESSION/REPORT/PLAN  RADIOGRAPHS  Current radiographs show previous derotational osteotomy with a dynamic hip screw,  which has been removed. She also has signs of acetabular retroversion with a markedly positive posterior wall, positive ischial spine, and a large crossover. All three are indicative of a purely retroverted socket. Her center of her sacral coccygeal junction to symphysis is at 58.4 mm, so it is a well taken x-ray. Her current center edge angle is 29 degrees, and her Tonnis angle is 6.5 degrees with normal medial clear space and no break in Shenton's line. She has a very small distal cam bump seen on the frog-leg and cross-table lateral, and on her false profile view she has significant acetabular retroversion with a prominent sub spine area and excellent maintenance of her joint space. Her anterior center edge angle is 35 degrees.     IMPRESSION  #1 Persistent hip pain status post derotational proximal femoral osteotomy performed elsewhere  #2 Acetabular retroversion  The MRI does show an anterosuperior labral tear with a large, hypertrophic labrum. No acetabular or bone marrow edema or cyst formation.    From my standpoint, it is unclear to me in this severely deconditioned patient if this is an intra- or extraarticular process. I do not have a good explanation for why she would have such good relief from having a gadolinium in the joint, however, the patient does have 4/5 Srinivas signs consistent with hyperlaxity, and it is certainly possible that just the fluid in the joint gave enough tension in the joint if she had a little hip instability  that this would alleviate some of her symptoms.    Recommendation is therefore a Genetics consultation to rule out Praneeth-Danlos syndrome.  #3 A fluoroscopic guided ropivacaine challenge  I have asked the patient to contact me with the results of the injection accordingly. If she does get great relief from the injection, then I would get a CT scan to further assess her socket and then offer definitive care thereafter. If she gets no relief from the injection, I will consider  this a soft tissue issue, and focus on physical therapy and pain management accordingly. Discussed in ail with the patient and her family.    DIAGNOSES  #1 Persistent hip pain status post derotational proximal femoral osteotomy performed elsewhere  #2 Acetabular retroversion  #3 A fluoroscopic guided ropivacaine challenge    Original: bal/mmi revised by grisel  Electronically Signed: 29-Jan-2018 16:19 by LUCIA Bearden MD    Clinical Notes - [id number] Id: [id number] Status: Fnl        Problem list and histories reviewed & adjusted, as indicated.  Additional history: as documented    Patient Active Problem List   Diagnosis     ACP (advance care planning)     EDS (Praneeth-Danlos syndrome)     Past Surgical History:   Procedure Laterality Date     ABDOMEN SURGERY  2014    laproscopy, endometriosis     CARDIAC SURGERY  3-2016    ablation     DILATION AND CURETTAGE  7/18/2013    Procedure: DILATION AND CURETTAGE;;  Surgeon: Latesha Camacho MD;  Location: [facility]     LAPAROSCOPY DIAGNOSTIC (GYN)  7/18/2013    Procedure: LAPAROSCOPY DIAGNOSTIC (GYN);  DIAGNOSTIC LAPAROSCOPY, Dilation & Curettage;  Surgeon: Latesha Camacho MD;  Location: [facility]     LAPAROSCOPY DIAGNOSTIC CHILD       ORTHOPEDIC SURGERY  12/17/15    hp surgery      TONSILLECTOMY       TYMPANOPLASTY CHILD       wisdom teeth resection         Social History   Substance Use Topics     Smoking status: Former Smoker     Packs/day: 0.50     Quit date: 7/24/2016     Smokeless tobacco: Never Used      Comment: 3-4 cigs per daydown to 2 a day     Alcohol use No     Family History   Problem Relation Age of Onset     Crohn Disease Mother      Arthritis Mother      Autoimmune Disease Mother      Coronary Artery Disease Father      Thyroid Disease Sister      Thyroid Disease Daughter          Current Outpatient Prescriptions   Medication Sig Dispense Refill     EVENING PRIMROSE OIL PO Take 1,300 mg by mouth       Multiple Vitamins-Minerals (ALIVE ONCE DAILY WOMENS PO)         "NONFORMULARY Collagen/hyaluronic       levonorgest-eth estrad 91-Day (SEASONIQUE) 0.15-0.03 &0.01 MG per tablet Take 1 tablet by mouth daily 91 tablet 3     Allergies   Allergen Reactions     Azithromycin Swelling     Amoxicillin      Target lesions     Cefaclor      Erythromycin GI Disturbance     target lesions     Keflex [Cephalexin Hcl]      Penicillins      Potassium Difficulty breathing     Sulfa Drugs      Prednisone Anxiety     Recent Labs   Lab Test  11/01/17   0909  01/17/17   1522  01/03/17   1510   12/04/15   1346   11/05/15   1120  09/30/15   1156   ALT   --    --    --    --    --    --   26 17   CR  0.61   --   0.47*   < >   --    < >  0.68  0.68   GFRESTIMATED  >90   --   >90  Non  GFR Calc     < >   --    < >  >90  Non  GFR Calc    >90  Non  GFR Calc     GFRESTBLACK  >90   --   >90   GFR Calc     < >   --    < >  >90   GFR Calc    >90   GFR Calc     POTASSIUM  3.7  3.7  3.1*   < >   --    < >  3.6  3.7   TSH   --    --    --    --   0.55   --    --    --     < > = values in this interval not displayed.      BP Readings from Last 3 Encounters:   04/09/18 110/70   11/01/17 100/70   09/28/17 110/64    Wt Readings from Last 3 Encounters:   04/09/18 131 lb 12.8 oz (59.8 kg)   11/01/17 129 lb (58.5 kg)   09/28/17 138 lb (62.6 kg)                  Labs reviewed in EPIC    Reviewed and updated as needed this visit by clinical staff  Tobacco  Allergies  Meds       Reviewed and updated as needed this visit by Provider         ROS:  Constitutional, HEENT, cardiovascular, pulmonary, GI, , musculoskeletal, neuro, skin, endocrine and psych systems are negative, except as otherwise noted.    OBJECTIVE:     /70 (BP Location: Right arm, Patient Position: Sitting, Cuff Size: Adult Regular)  Pulse 84  Temp 98.2  F (36.8  C) (Tympanic)  Resp 14  Ht 5' 4\" (1.626 m)  Wt 131 lb 12.8 oz (59.8 kg)  SpO2 100%  " BMI 22.62 kg/m2  Body mass index is 22.62 kg/(m^2).     GENERAL: healthy, alert and no distress  EYES: Eyes grossly normal to inspection, PERRL and conjunctivae and sclerae normal  NECK: no adenopathy, no asymmetry, masses, or scars and thyroid normal to palpation  RESP: lungs clear to auscultation - no rales, rhonchi or wheezes  CV: regular rate and rhythm, normal S1 S2, no S3 or S4, no murmur, click or rub, no peripheral edema and peripheral pulses strong  MS: superficial rib soreness - anterior and posterior, throughout.  Bilateral shoulder pain, but overall good ROM.  TMJ soreness.   SKIN: no suspicious lesions or rashes      Results for orders placed or performed in visit on 04/09/18 (from the past 48 hour(s))   CRP, inflammation   Result Value Ref Range    CRP Inflammation <2.9 0.0 - 8.0 mg/L   ESR: Erythrocyte sedimentation rate   Result Value Ref Range    Sed Rate 7 0 - 20 mm/h   CBC with platelets differential   Result Value Ref Range    WBC 9.7 4.0 - 11.0 10e9/L    RBC Count 4.45 3.8 - 5.2 10e12/L    Hemoglobin 12.7 11.7 - 15.7 g/dL    Hematocrit 38.0 35.0 - 47.0 %    MCV 85 78 - 100 fl    MCH 28.5 26.5 - 33.0 pg    MCHC 33.4 31.5 - 36.5 g/dL    RDW 13.5 10.0 - 15.0 %    Platelet Count 335 150 - 450 10e9/L    Diff Method Automated Method     % Neutrophils 63.5 %    % Lymphocytes 30.3 %    % Monocytes 4.7 %    % Eosinophils 1.2 %    % Basophils 0.3 %    Absolute Neutrophil 6.1 1.6 - 8.3 10e9/L    Absolute Lymphocytes 2.9 0.8 - 5.3 10e9/L    Absolute Monocytes 0.5 0.0 - 1.3 10e9/L    Absolute Eosinophils 0.1 0.0 - 0.7 10e9/L    Absolute Basophils 0.0 0.0 - 0.2 10e9/L         Visit time:   Over 40 minutes are spent with the patient.   Greater than 50 % of our visit time was spent face to face and included education and counseling regarding current conditions,  medication management, and plan of care.  We discussed the importance of medication compliance.  Visit Content:   Discussed pain, disease process,  cardio FU  Lab testing discussed and reviewed  Any medication adjustment has been reviewed  Health Maintenance - updated as appropriate.  Record review completed          ASSESSMENT/PLAN:     1. EDS (Praneeth-Danlos syndrome)  - CRP, inflammation  - ESR: Erythrocyte sedimentation rate  - CBC with platelets differential  - INTERNAL MEDICINE REFERRAL  - RHEUMATOLOGY REFERRAL  - acetaminophen-codeine (TYLENOL #3) 300-30 MG per tablet; 1-2 po BID PRN pain  Dispense: 40 tablet; Refill: 0    2. Ninfa-Parkinson-White syndrome  - INTERNAL MEDICINE REFERRAL    3. Bilateral shoulder pain, unspecified chronicity  - acetaminophen-codeine (TYLENOL #3) 300-30 MG per tablet; 1-2 po BID PRN pain  Dispense: 40 tablet; Refill: 0  - Aleve OTC PRN    4. Nausea  - ondansetron (ZOFRAN) 4 MG tablet; 1-2 po TID PRN nausea  Dispense: 30 tablet; Refill: 0          Itzel Ayala NP  [facility]

## 2018-04-09 NOTE — MR AVS SNAPSHOT
After Visit Summary   4/9/2018    Shy White    MRN: [id number]           Patient Information     Date Of Birth          1990        Visit Information        Provider Department      4/9/2018 3:30 PM Itzel Ayala NP [facility]        Today's Diagnoses     EDS (Praneeth-Danlos syndrome)    -  1    Ninfa-Parkinson-White syndrome        Bilateral shoulder pain, unspecified chronicity        Nausea          Care Instructions        ASSESSMENT/PLAN:     1. EDS (Praneeth-Danlos syndrome)  - CRP, inflammation  - ESR: Erythrocyte sedimentation rate  - CBC with platelets differential  - INTERNAL MEDICINE REFERRAL  - RHEUMATOLOGY REFERRAL  - acetaminophen-codeine (TYLENOL #3) 300-30 MG per tablet; 1-2 po BID PRN pain  Dispense: 40 tablet; Refill: 0    2. Ninfa-Parkinson-White syndrome  - INTERNAL MEDICINE REFERRAL    3. Bilateral shoulder pain, unspecified chronicity  - acetaminophen-codeine (TYLENOL #3) 300-30 MG per tablet; 1-2 po BID PRN pain  Dispense: 40 tablet; Refill: 0  - Aleve OTC PRN    4. Nausea  - ondansetron (ZOFRAN) 4 MG tablet; 1-2 po TID PRN nausea  Dispense: 30 tablet; Refill: 0          Itzel Ayala NP  [facility]              Follow-ups after your visit        Additional Services     INTERNAL MEDICINE REFERRAL       Your provider has referred you to: Internal medicine - Dr Beck, to establish care    Please be aware that coverage of these services is subject to the terms and limitations of your health insurance plan.  Call member services at your health plan with any benefit or coverage questions.      Please bring the following to your appointment:  >>   Any x-rays, CTs or MRIs which have been performed.  Contact the facility where they were done to arrange for  prior to your scheduled appointment.   >>   List of current medications   >>   This referral request   >>   Any documents/labs given to you for this referral            RHEUMATOLOGY  "REFERRAL       Your provider has referred you to: Rheumatology - [facility] or [place], prefers outreach if possible.  New Dx of Praneeth Danlos Syndrome, a connective tissue disorder.  Is experiencing shoulder and rib pain.  All work up was at [facility], Please request all notes and imaging, there is not much on file here.    Please be aware that coverage of these services is subject to the terms and limitations of your health insurance plan.  Call member services at your health plan with any benefit or coverage questions.      Please bring the following with you to your appointment:    (1) Any X-Rays, CTs or MRIs which have been performed.  Contact the facility where they were done to arrange for  prior to your scheduled appointment.    (2) List of current medications   (3) This referral request   (4) Any documents/labs given to you for this referral                  Who to contact     If you have questions or need follow up information about today's clinic visit or your schedule please contact [facility] directly at [phone].  Normal or non-critical lab and imaging results will be communicated to you by Coin[employer], letter or phone within 4 business days after the clinic has received the results. If you do not hear from us within 7 days, please contact the clinic through Coin[employer] or phone. If you have a critical or abnormal lab result, we will notify you by phone as soon as possible.  Submit refill requests through [employer] or call your pharmacy and they will forward the refill request to us. Please allow 3 business days for your refill to be completed.          Additional Information About Your Visit        [employer] Information     [employer] lets you send messages to your doctor, view your test results, renew your prescriptions, schedule appointments and more. To sign up, go to [email or web address] . Click on \"Log in\" on the left side of the screen, which will take you to the Welcome page. Then " "click on \"Sign up Now\" on the right side of the page.     You will be asked to enter the access code listed below, as well as some personal information. Please follow the directions to create your username and password.     Your access code is: [email or web address]  Expires: 2018  4:47 PM     Your access code will  in 90 days. If you need help or a new code, please call your [place] clinic or [phone].        Care EveryWhere ID     This is your Care EveryWhere ID. This could be used by other organizations to access your [place] medical records  [id number]        Your Vitals Were     Pulse Temperature Respirations Height Pulse Oximetry BMI (Body Mass Index)    84 98.2  F (36.8  C) (Tympanic) 14 5' 4\" (1.626 m) 100% 22.62 kg/m2       Blood Pressure from Last 3 Encounters:   18 110/70   17 100/70   17 110/64    Weight from Last 3 Encounters:   18 131 lb 12.8 oz (59.8 kg)   17 129 lb (58.5 kg)   17 138 lb (62.6 kg)              We Performed the Following     CBC with platelets differential     CRP, inflammation     ESR: Erythrocyte sedimentation rate     INTERNAL MEDICINE REFERRAL     RHEUMATOLOGY REFERRAL          Today's Medication Changes          These changes are accurate as of 18  4:53 PM.  If you have any questions, ask your nurse or doctor.               Start taking these medicines.        Dose/Directions    acetaminophen-codeine 300-30 MG per tablet   Commonly known as:  TYLENOL #3   Used for:  Bilateral shoulder pain, unspecified chronicity, EDS (Praneeth-Danlos syndrome)   Started by:  Itzel Ayala NP        1-2 po BID PRN pain   Quantity:  40 tablet   Refills:  0       ondansetron 4 MG tablet   Commonly known as:  ZOFRAN   Used for:  Nausea   Started by:  Itzel Ayala NP        1-2 po TID PRN nausea   Quantity:  30 tablet   Refills:  0            Where to get your medicines      [facility] [address] [place], [place] [id number] " [address] AT [facility] [address]  [address], [facility] [address]     Phone:  [phone]     ondansetron 4 MG tablet         Some of these will need a paper prescription and others can be bought over the counter.  Ask your nurse if you have questions.     Bring a paper prescription for each of these medications     acetaminophen-codeine 300-30 MG per tablet                Primary Care Provider Office Phone # Fax #    Itzel AyalaMAYA [phone] [address] [place]  [place] [address]        Equal Access to Services     [facility]: Hadii aad ku hadasho Soomaali, waaxda luqadaha, qaybta kaalmada adeegyada, waxdru vivas hayshahrzad el . So [facility] [phone].    ATENCIÓN: Si habla español, tiene a zhao disposición servicios gratuitos de asistencia lingüística. [place] [phone].    We comply with applicable federal civil rights laws and [place] laws. We do not discriminate on the basis of race, color, national origin, age, disability, sex, sexual orientation, or gender identity.            Thank you!     Thank you for choosing [facility]  for your care. Our goal is always to provide you with excellent care. Hearing back from our patients is one way we can continue to improve our services. Please take a few minutes to complete the written survey that you may receive in the mail after your visit with us. Thank you!             Your Updated Medication List - Protect others around you: Learn how to safely use, store and throw away your medicines at www.disposemymeds.org.          This list is accurate as of 4/9/18  4:53 PM.  Always use your most recent med list.                   Brand Name Dispense Instructions for use Diagnosis    acetaminophen-codeine 300-30 MG per tablet    TYLENOL #3    40 tablet    1-2 po BID PRN pain    Bilateral shoulder pain, unspecified chronicity, EDS (Praneeth-Danlos syndrome)       ALIVE ONCE DAILY WOMENS PO            EVENING PRIMROSE OIL PO      Take 1,300 mg by mouth        levonorgest-eth estrad 91-Day 0.15-0.03 &0.01 MG per tablet    SEASONIQUE    91 tablet    Take 1 tablet by mouth daily    Encounter for initial prescription of contraceptive pills       NONFORMULARY      Collagen/hyaluronic        ondansetron 4 MG tablet    ZOFRAN    30 tablet    1-2 po TID PRN nausea    Nausea

## 2018-04-09 NOTE — PATIENT INSTRUCTIONS
ASSESSMENT/PLAN:     1. EDS (Praneeth-Danlos syndrome)  - CRP, inflammation  - ESR: Erythrocyte sedimentation rate  - CBC with platelets differential  - INTERNAL MEDICINE REFERRAL  - RHEUMATOLOGY REFERRAL  - acetaminophen-codeine (TYLENOL #3) 300-30 MG per tablet; 1-2 po BID PRN pain  Dispense: 40 tablet; Refill: 0    2. Ninfa-Parkinson-White syndrome  - INTERNAL MEDICINE REFERRAL    3. Bilateral shoulder pain, unspecified chronicity  - acetaminophen-codeine (TYLENOL #3) 300-30 MG per tablet; 1-2 po BID PRN pain  Dispense: 40 tablet; Refill: 0  - Aleve OTC PRN    4. Nausea  - ondansetron (ZOFRAN) 4 MG tablet; 1-2 po TID PRN nausea  Dispense: 30 tablet; Refill: 0          Itzel Ayala NP  [facility]

## 2018-04-09 NOTE — NURSING NOTE
"Chief Complaint   Patient presents with     Other     Praneeth Danlos Syndrome       Initial /70 (BP Location: Right arm, Patient Position: Sitting, Cuff Size: Adult Regular)  Pulse 84  Temp 98.2  F (36.8  C) (Tympanic)  Resp 14  Ht 5' 4\" (1.626 m)  Wt 131 lb 12.8 oz (59.8 kg)  SpO2 100%  BMI 22.62 kg/m2 Estimated body mass index is 22.62 kg/(m^2) as calculated from the following:    Height as of this encounter: 5' 4\" (1.626 m).    Weight as of this encounter: 131 lb 12.8 oz (59.8 kg).  Medication Reconciliation: shad Posadas      "

## 2018-04-10 LAB — CRP SERPL-MCNC: <2.9 MG/L (ref 0–8)

## 2018-04-10 ASSESSMENT — PATIENT HEALTH QUESTIONNAIRE - PHQ9: SUM OF ALL RESPONSES TO PHQ QUESTIONS 1-9: 3

## 2018-04-10 ASSESSMENT — ANXIETY QUESTIONNAIRES: GAD7 TOTAL SCORE: 2

## 2018-04-10 NOTE — PROGRESS NOTES
Labs are normal  She has been referred to Dr Beck and to Rheumatology    Itzel Ayala [facility]  [phone]

## 2018-04-11 NOTE — PROGRESS NOTES
Faxed Requests for Medical Records - [facility], [facility] and [facility].

## 2018-10-18 DIAGNOSIS — Z30.011 ENCOUNTER FOR INITIAL PRESCRIPTION OF CONTRACEPTIVE PILLS: ICD-10-CM

## 2018-10-18 RX ORDER — LEVONORGESTREL AND ETHINYL ESTRADIOL AND ETHINYL ESTRADIOL 150-30(84)
KIT ORAL
Qty: 91 TABLET | Refills: 0 | Status: SHIPPED | OUTPATIENT
Start: 2018-10-18 | End: 2019-06-03

## 2018-11-09 NOTE — LACTATION NOTE
"Initial Lactation Consultation    Shy Catherine                                                                                                    [phone]    Consultation Date: 3/24/2017    Reason for Lactation Referral:routine lactation assessment.    MATERNAL HISTORY   Maternal History: 2nd baby, vaginal delivery  History of Breast Surgery: No  Breast Changes During Pregnancy: Yes  Breast Feeding History: Yes,  successful, Length of Time: 8 months  Maternal Meds: see eMar    MATERNAL ASSESSMENT    Breast Size: average  Nipple Appearance - Left: intact  Nipple Appearance - Right: intact  Nipple Erectility - Left: erect with stimulation  Nipple Erectility - Right: erect with stimulation  Areolas Compressibility: soft  Nipple Size: average  Milk Supply: colostrum    INFANT ASSESSMENT    Oral Anatomy  Mouth: normal  Palate: normal  Jaw: normal    FEEDING   Feeding Time:states fed baby just before visit  Results: good breast feed per mom    FEEDING PLAN    Home Feeding Plan: Nurse on demand, responding to infant's feeding cues. Snuggle in skin-to-skin to learn positioning and infant cues. Rooming-in encouraged.    LACTATION COMMENTS   Deep latch explained for proper positioning of breast in infant's mouth, maximizing milk transfer and comfort.  Discussed supply and demand.  Hand expression taught and return demonstration observed with colostrum present.  [place] signs of satiety reviewed.  \"Ways to know that baby is getting enough\" discussed thoroughly.  Discussed pacifiers.  Follow-up support information provided.    __________________________________________________________________________________  JOSE ROBERTO MCALLISTER RN, IBCLC  3/24/2017      " Discharge plan remains to discharge home with pulmonary rehab.  May need home o2 eval. Will follow

## 2018-11-14 ENCOUNTER — TELEPHONE (OUTPATIENT)
Dept: FAMILY MEDICINE | Facility: OTHER | Age: 28
End: 2018-11-14

## 2018-11-14 NOTE — TELEPHONE ENCOUNTER
Patient called and sated she missed her appointment for the rheumatology referral at [facility]. Patient stated that [facility] is no longer taking new patient and is needing referral to go to another location and provider. Patient expressed she did not have any particular provider in mind to see she would just like a new rheumatology referral placed.   Leigh Nuñez, CMA

## 2018-11-15 NOTE — TELEPHONE ENCOUNTER
Faxed Referral, Demo, Med List, Xrays and progress note(s) to [facility] Rheumatology Ph. [phone] Fx. [phone]

## 2018-11-16 NOTE — TELEPHONE ENCOUNTER
If it is the [facility], do I need to enter a new referral/  I think we can just send the same info we sent to [facility]?  Pls let me know    Itzel OLIVEIRA  [phone]

## 2018-11-16 NOTE — TELEPHONE ENCOUNTER
[facility] Rheumatology is declining referral as they do not see Praneeth Danlos.  They are recommending [facility] for this dx.

## 2018-11-20 ENCOUNTER — TELEPHONE (OUTPATIENT)
Dept: RHEUMATOLOGY | Facility: CLINIC | Age: 28
End: 2018-11-20

## 2018-11-20 NOTE — TELEPHONE ENCOUNTER
LVM with patient notifying patient's chart is being reviewed by Lazaro and they will be contacting her with any questions before scheduling.

## 2018-11-20 NOTE — TELEPHONE ENCOUNTER
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 There are records from [place] and [place] in her chart. Please review and let this patient know if this is something the clinic will see her for.     Action Taken: Message routed to:  [facility] Rheumatology

## 2018-11-27 ENCOUNTER — TRANSFERRED RECORDS (OUTPATIENT)
Dept: HEALTH INFORMATION MANAGEMENT | Facility: CLINIC | Age: 28
End: 2018-11-27

## 2018-11-27 LAB
ALT SERPL-CCNC: 10 IU/L (ref 6–31)
AST SERPL-CCNC: 13 IU/L (ref 10–40)
CREAT SERPL-MCNC: 0.76 MG/DL (ref 0.4–1)
GFR SERPL CREATININE-BSD FRML MDRD: >60 ML/MIN/1.73M2
GLUCOSE SERPL-MCNC: 93 MG/DL (ref 70–99)
POTASSIUM SERPL-SCNC: 4 MEQ/L (ref 3.4–5.1)

## 2018-11-28 NOTE — TELEPHONE ENCOUNTER
This is not a diagnosis that we see in the Rheumatology Clinic. Staff message sent to the referring provider explaining this. Left a message of this information at patient's home number.   Shantal Greenberg CMA  11/28/2018 9:29 AM

## 2019-01-22 DIAGNOSIS — Z30.011 ENCOUNTER FOR INITIAL PRESCRIPTION OF CONTRACEPTIVE PILLS: ICD-10-CM

## 2019-01-22 RX ORDER — LEVONORGESTREL AND ETHINYL ESTRADIOL AND ETHINYL ESTRADIOL 150-30(84)
KIT ORAL
Qty: 91 TABLET | Refills: 0 | OUTPATIENT
Start: 2019-01-22

## 2019-01-22 NOTE — TELEPHONE ENCOUNTER
Left message for patient informing her that she needs an annual exam in order to get her BCP filled.

## 2019-01-22 NOTE — TELEPHONE ENCOUNTER
ASHLYNA 0.15-0.03 &0.01 MG per tablet      Last Written Prescription Date:  10/18/18  Last Fill Quantity: 91,   # refills: 0  Last Office Visit: 9/28/17 with Buffy  Future Office visit:       Routing refill request to provider for review/approval because:  Pt due for f/u appt with OB/GYN

## 2019-01-22 NOTE — TELEPHONE ENCOUNTER
She has not been seen by me since 9/2017 so I am unable to fill this request.  Looks like already did the one refill to get her through to an appointment.  She may also go through her PCP if she is up to date with her.

## 2019-04-01 ENCOUNTER — TRANSFERRED RECORDS (OUTPATIENT)
Dept: HEALTH INFORMATION MANAGEMENT | Facility: CLINIC | Age: 29
End: 2019-04-01

## 2019-04-01 LAB
ALT SERPL-CCNC: 10 IU/L (ref 6–31)
AST SERPL-CCNC: 15 IU/L (ref 10–40)
CREAT SERPL-MCNC: 0.76 MG/DL (ref 0.4–1)
GFR SERPL CREATININE-BSD FRML MDRD: >60 ML/MIN/1.73M2
GLUCOSE SERPL-MCNC: 90 MG/DL (ref 70–99)
INR PPP: 1.1 (ref 0.9–1.1)
POTASSIUM SERPL-SCNC: 3.5 MEQ/L (ref 3.4–5.1)

## 2019-04-03 ENCOUNTER — TRANSFERRED RECORDS (OUTPATIENT)
Dept: HEALTH INFORMATION MANAGEMENT | Facility: CLINIC | Age: 29
End: 2019-04-03

## 2019-04-09 ENCOUNTER — TRANSFERRED RECORDS (OUTPATIENT)
Dept: HEALTH INFORMATION MANAGEMENT | Facility: CLINIC | Age: 29
End: 2019-04-09

## 2019-04-18 ENCOUNTER — TRANSFERRED RECORDS (OUTPATIENT)
Dept: HEALTH INFORMATION MANAGEMENT | Facility: CLINIC | Age: 29
End: 2019-04-18

## 2019-04-23 ENCOUNTER — TRANSFERRED RECORDS (OUTPATIENT)
Dept: HEALTH INFORMATION MANAGEMENT | Facility: CLINIC | Age: 29
End: 2019-04-23

## 2019-04-24 ENCOUNTER — TRANSFERRED RECORDS (OUTPATIENT)
Dept: HEALTH INFORMATION MANAGEMENT | Facility: CLINIC | Age: 29
End: 2019-04-24

## 2019-05-08 ENCOUNTER — TRANSFERRED RECORDS (OUTPATIENT)
Dept: HEALTH INFORMATION MANAGEMENT | Facility: CLINIC | Age: 29
End: 2019-05-08

## 2019-06-03 ENCOUNTER — OFFICE VISIT (OUTPATIENT)
Dept: FAMILY MEDICINE | Facility: OTHER | Age: 29
End: 2019-06-03
Attending: NURSE PRACTITIONER
Payer: COMMERCIAL

## 2019-06-03 VITALS
BODY MASS INDEX: 21.63 KG/M2 | TEMPERATURE: 99 F | SYSTOLIC BLOOD PRESSURE: 110 MMHG | DIASTOLIC BLOOD PRESSURE: 70 MMHG | HEART RATE: 72 BPM | WEIGHT: 126 LBS | RESPIRATION RATE: 14 BRPM

## 2019-06-03 DIAGNOSIS — N89.8 VAGINAL CYST: ICD-10-CM

## 2019-06-03 DIAGNOSIS — H72.92: Primary | ICD-10-CM

## 2019-06-03 DIAGNOSIS — H66.92: Primary | ICD-10-CM

## 2019-06-03 PROCEDURE — 99214 OFFICE O/P EST MOD 30 MIN: CPT | Performed by: NURSE PRACTITIONER

## 2019-06-03 RX ORDER — CEFDINIR 300 MG/1
CAPSULE ORAL
Refills: 0 | COMMUNITY
Start: 2019-06-02 | End: 2019-09-25

## 2019-06-03 RX ORDER — CEFACLOR 250 MG/5ML
FOR SUSPENSION ORAL
COMMUNITY
End: 2019-09-25

## 2019-06-03 ASSESSMENT — ANXIETY QUESTIONNAIRES
5. BEING SO RESTLESS THAT IT IS HARD TO SIT STILL: NOT AT ALL
6. BECOMING EASILY ANNOYED OR IRRITABLE: NOT AT ALL
1. FEELING NERVOUS, ANXIOUS, OR ON EDGE: NOT AT ALL
IF YOU CHECKED OFF ANY PROBLEMS ON THIS QUESTIONNAIRE, HOW DIFFICULT HAVE THESE PROBLEMS MADE IT FOR YOU TO DO YOUR WORK, TAKE CARE OF THINGS AT HOME, OR GET ALONG WITH OTHER PEOPLE: NOT DIFFICULT AT ALL
7. FEELING AFRAID AS IF SOMETHING AWFUL MIGHT HAPPEN: NOT AT ALL
3. WORRYING TOO MUCH ABOUT DIFFERENT THINGS: NOT AT ALL
GAD7 TOTAL SCORE: 0
2. NOT BEING ABLE TO STOP OR CONTROL WORRYING: NOT AT ALL

## 2019-06-03 ASSESSMENT — PATIENT HEALTH QUESTIONNAIRE - PHQ9
5. POOR APPETITE OR OVEREATING: NOT AT ALL
SUM OF ALL RESPONSES TO PHQ QUESTIONS 1-9: 0

## 2019-06-03 NOTE — PROGRESS NOTES
Shy White is a 29 year old female who presents to clinic today for the following health issues:      Acute Illness/left ear, ruptured eardrum    Seen at [facility], [id number]       Fever: YES- low grade, 99    Chills/Sweats: YES- sweats    Headache (location?): YES    Sinus Pressure:YES    Conjunctivitis:  no    Ear Pain: YES- left,     right ear also has pressure    Rhinorrhea: YES    Congestion: YES- head    Sore Throat: no      Cough: no    Wheeze: no     Decreased Appetite: no     Nausea: no     Vomiting: no     Diarrhea:  no     Dysuria/Freq.: no     Fatigue/Achiness: no     Sick/Strep Exposure: no      Therapies Tried and outcome: Omnicef oral for 10 days and Ceclor ear gtts        Vaginal cyst  Onset: 6 yrs ago, tiny, now bigger, 1/2 dollar size    Description:  Vaginal Discharge: none   Itching (Pruritis): no   Burning sensation:  no   Odor: no     Accompanying Signs & Symptoms:  Pain with Urination: no   Abdominal Pain: no   Fever: no     History:   Sexually active: YES  Therapies Tried and outcome: nothing        Patient Active Problem List   Diagnosis     ACP (advance care planning)     EDS (Praneeth-Danlos syndrome)     Past Surgical History:   Procedure Laterality Date     ABDOMEN SURGERY  2014    laproscopy, endometriosis     CARDIAC SURGERY  3-2016    ablation     CHOLECYSTECTOMY  04/2019     DILATION AND CURETTAGE  7/18/2013    Procedure: DILATION AND CURETTAGE;;  Surgeon: Latesah Camacho MD;  Location: [facility]     LAPAROSCOPY DIAGNOSTIC (GYN)  7/18/2013    Procedure: LAPAROSCOPY DIAGNOSTIC (GYN);  DIAGNOSTIC LAPAROSCOPY, Dilation & Curettage;  Surgeon: Latesha Camacho MD;  Location: [facility]     LAPAROSCOPY DIAGNOSTIC CHILD       ORTHOPEDIC SURGERY  12/17/15     surgery      ORTHOPEDIC SURGERY Right 11/08/2017    rt femur plate and screw removal     TONSILLECTOMY       TYMPANOPLASTY CHILD       wisdom teeth resection         Social History     Tobacco Use     Smoking status: Former Smoker      Packs/day: 0.50     Last attempt to quit: 2016     Years since quittin.8     Smokeless tobacco: Never Used     Tobacco comment: 3-4 cigs per daydown to 2 a day   Substance Use Topics     Alcohol use: No     Family History   Problem Relation Age of Onset     Crohn's Disease Mother      Arthritis Mother      Autoimmune Disease Mother      Coronary Artery Disease Father      Thyroid Disease Sister      Thyroid Disease Daughter              Current Outpatient Medications   Medication Sig Dispense Refill     cefaclor (CECLOR) 250 MG/5ML suspension 10 gtts to left ear daily       cefdinir (OMNICEF) 300 MG capsule 1 daily for 10,days  0         Allergies   Allergen Reactions     Azithromycin Swelling     Amoxicillin      Target lesions     Erythromycin GI Disturbance     target lesions     Keflex [Cephalexin Hcl]      Penicillins      Potassium Difficulty breathing     Sulfa Drugs      Prednisone Anxiety     Recent Labs   Lab Test 19  0909  17  1510  12/04/15  1346  11/05/15  1120   ALT 10 10  --   --   --   --   --   --  26   CR 0.76 0.76 0.61  --  0.47*   < >  --    < > 0.68   GFRESTIMATED >60 >60 >90  --  >90  Non  GFR Calc     < >  --    < > >90  Non  GFR Calc     GFRESTBLACK  --   --  >90  --  >90  African American GFR Calc     < >  --    < > >90   GFR Calc     POTASSIUM 3.5 4.0 3.7   < > 3.1*   < >  --    < > 3.6   TSH  --   --   --   --   --   --  0.55  --   --     < > = values in this interval not displayed.          BP Readings from Last 3 Encounters:   19 110/70   18 110/70   17 100/70    Wt Readings from Last 3 Encounters:   19 57.2 kg (126 lb)   18 59.8 kg (131 lb 12.8 oz)   17 58.5 kg (129 lb)           PHQ-9 SCORE 2017 2018 6/3/2019   PHQ-9 Total Score 2 3 0     LOYDA-7 SCORE 2017 2018 6/3/2019   Total Score 6 2 0              Reviewed and updated as needed this visit by  Provider         Review of Systems   ROS COMP: Constitutional, HEENT, cardiovascular, pulmonary, gi and gu systems are negative, except as otherwise noted.        Objective    /70 (BP Location: Left arm, Patient Position: Sitting, Cuff Size: Adult Large)   Pulse 72   Temp 99  F (37.2  C)   Resp 14   Wt 57.2 kg (126 lb)   LMP 05/07/2019   BMI 21.63 kg/m    Body mass index is 21.63 kg/m .       Physical Exam   GENERAL: healthy, alert and no distress  EYES: Eyes grossly normal to inspection, PERRL and conjunctivae and sclerae normal  HENT: left ear: erythematous and perforation of TM  NECK: no adenopathy, no asymmetry, masses, or scars and thyroid normal to palpation  RESP: lungs clear to auscultation - no rales, rhonchi or wheezes  CV: regular rate and rhythm, normal S1 S2, no S3 or S4, no murmur, click or rub, no peripheral edema and peripheral pulses strong  ABDOMEN: soft, nontender, no hepatosplenomegaly, no masses and bowel sounds normal   (female): large 2 cm size cyst, low labia minora, probable bartholin's cyst - is growing, painful to touch, confluent            Assessment & Plan     1. Infection of left middle ear with rupture of eardrum  - Continue antibiotics and drops  - Try a control ball to reduce noise irritation    2. Vaginal cyst  - OB/GYN REFERRAL       Return for OB - [place]. OB department will call you      Itzel Ayala NP  [facility]

## 2019-06-03 NOTE — PATIENT INSTRUCTIONS
Assessment & Plan     1. Infection of left middle ear with rupture of eardrum  - Continue antibiotics and drops  - Try a control ball to reduce noise irritation    2. Vaginal cyst  - OB/GYN REFERRAL       Return for OB - [place]. OB department will call you      Itzel Ayala NP  [facility] [place] [facility]

## 2019-06-03 NOTE — NURSING NOTE
"Chief Complaint   Patient presents with     UC Follow-Up     Derm Problem       Initial /70 (BP Location: Left arm, Patient Position: Sitting, Cuff Size: Adult Large)   Pulse 72   Temp 99  F (37.2  C)   Resp 14   Wt 57.2 kg (126 lb)   LMP 05/07/2019   BMI 21.63 kg/m   Estimated body mass index is 21.63 kg/m  as calculated from the following:    Height as of 4/9/18: 1.626 m (5' 4\").    Weight as of this encounter: 57.2 kg (126 lb).  Medication Reconciliation: complete    Cristina Burnett LPN    "

## 2019-06-04 ASSESSMENT — ANXIETY QUESTIONNAIRES: GAD7 TOTAL SCORE: 0

## 2019-09-25 ENCOUNTER — APPOINTMENT (OUTPATIENT)
Dept: GENERAL RADIOLOGY | Facility: HOSPITAL | Age: 29
End: 2019-09-25
Attending: NURSE PRACTITIONER
Payer: COMMERCIAL

## 2019-09-25 ENCOUNTER — HOSPITAL ENCOUNTER (EMERGENCY)
Facility: HOSPITAL | Age: 29
Discharge: HOME | End: 2019-09-25
Attending: NURSE PRACTITIONER | Admitting: NURSE PRACTITIONER
Payer: COMMERCIAL

## 2019-09-25 ENCOUNTER — TELEPHONE (OUTPATIENT)
Dept: FAMILY MEDICINE | Facility: OTHER | Age: 29
End: 2019-09-25

## 2019-09-25 VITALS
DIASTOLIC BLOOD PRESSURE: 70 MMHG | TEMPERATURE: 97.3 F | RESPIRATION RATE: 14 BRPM | SYSTOLIC BLOOD PRESSURE: 111 MMHG | OXYGEN SATURATION: 97 %

## 2019-09-25 DIAGNOSIS — R07.89 ATYPICAL CHEST PAIN: ICD-10-CM

## 2019-09-25 DIAGNOSIS — F41.9 ANXIETY: ICD-10-CM

## 2019-09-25 LAB
ALBUMIN SERPL-MCNC: 4.4 G/DL (ref 3.4–5)
ALBUMIN UR-MCNC: NEGATIVE MG/DL
ALP SERPL-CCNC: 65 U/L (ref 40–150)
ALT SERPL W P-5'-P-CCNC: 16 U/L (ref 0–50)
AMPHETAMINES UR QL: NOT DETECTED NG/ML
ANION GAP SERPL CALCULATED.3IONS-SCNC: 5 MMOL/L (ref 3–14)
APPEARANCE UR: CLEAR
AST SERPL W P-5'-P-CCNC: 6 U/L (ref 0–45)
BARBITURATES UR QL SCN: NOT DETECTED NG/ML
BASOPHILS # BLD AUTO: 0 10E9/L (ref 0–0.2)
BASOPHILS NFR BLD AUTO: 1.1 %
BENZODIAZ UR QL SCN: NOT DETECTED NG/ML
BILIRUB SERPL-MCNC: 0.6 MG/DL (ref 0.2–1.3)
BILIRUB UR QL STRIP: NEGATIVE
BUN SERPL-MCNC: 10 MG/DL (ref 7–30)
BUPRENORPHINE UR QL: NOT DETECTED NG/ML
CALCIUM SERPL-MCNC: 9.1 MG/DL (ref 8.5–10.1)
CANNABINOIDS UR QL: ABNORMAL NG/ML
CHLORIDE SERPL-SCNC: 110 MMOL/L (ref 94–109)
CO2 SERPL-SCNC: 25 MMOL/L (ref 20–32)
COCAINE UR QL SCN: NOT DETECTED NG/ML
COLOR UR AUTO: NORMAL
CREAT SERPL-MCNC: 0.61 MG/DL (ref 0.52–1.04)
CRP SERPL-MCNC: <2.9 MG/L (ref 0–8)
D DIMER PPP DDU-MCNC: <200 NG/ML D-DU (ref 0–300)
D-METHAMPHET UR QL: NOT DETECTED NG/ML
DIFFERENTIAL METHOD BLD: ABNORMAL
EOSINOPHIL # BLD AUTO: 0.1 10E9/L (ref 0–0.7)
EOSINOPHIL NFR BLD AUTO: 3.2 %
ERYTHROCYTE [DISTWIDTH] IN BLOOD BY AUTOMATED COUNT: 15.6 % (ref 10–15)
GFR SERPL CREATININE-BSD FRML MDRD: >90 ML/MIN/{1.73_M2}
GLUCOSE SERPL-MCNC: 85 MG/DL (ref 70–99)
GLUCOSE UR STRIP-MCNC: NEGATIVE MG/DL
HCG UR QL: NEGATIVE
HCT VFR BLD AUTO: 41.3 % (ref 35–47)
HGB BLD-MCNC: 13.6 G/DL (ref 11.7–15.7)
HGB UR QL STRIP: NEGATIVE
IMM GRANULOCYTES # BLD: 0 10E9/L (ref 0–0.4)
IMM GRANULOCYTES NFR BLD: 0 %
KETONES UR STRIP-MCNC: NEGATIVE MG/DL
LEUKOCYTE ESTERASE UR QL STRIP: NEGATIVE
LIPASE SERPL-CCNC: 91 U/L (ref 73–393)
LYMPHOCYTES # BLD AUTO: 1.8 10E9/L (ref 0.8–5.3)
LYMPHOCYTES NFR BLD AUTO: 47 %
MCH RBC QN AUTO: 27.4 PG (ref 26.5–33)
MCHC RBC AUTO-ENTMCNC: 32.9 G/DL (ref 31.5–36.5)
MCV RBC AUTO: 83 FL (ref 78–100)
METHADONE UR QL SCN: NOT DETECTED NG/ML
MONOCYTES # BLD AUTO: 0.3 10E9/L (ref 0–1.3)
MONOCYTES NFR BLD AUTO: 6.6 %
NEUTROPHILS # BLD AUTO: 1.6 10E9/L (ref 1.6–8.3)
NEUTROPHILS NFR BLD AUTO: 42.1 %
NITRATE UR QL: NEGATIVE
NRBC # BLD AUTO: 0 10*3/UL
NRBC BLD AUTO-RTO: 0 /100
OPIATES UR QL SCN: NOT DETECTED NG/ML
OXYCODONE UR QL SCN: NOT DETECTED NG/ML
PCP UR QL SCN: NOT DETECTED NG/ML
PH UR STRIP: 5 PH (ref 4.7–8)
PLATELET # BLD AUTO: 315 10E9/L (ref 150–450)
POTASSIUM SERPL-SCNC: 3.9 MMOL/L (ref 3.4–5.3)
PROPOXYPH UR QL: NOT DETECTED NG/ML
PROT SERPL-MCNC: 7.6 G/DL (ref 6.8–8.8)
RBC # BLD AUTO: 4.96 10E12/L (ref 3.8–5.2)
SODIUM SERPL-SCNC: 140 MMOL/L (ref 133–144)
SOURCE: NORMAL
SP GR UR STRIP: 1.01 (ref 1–1.03)
TRICYCLICS UR QL SCN: NOT DETECTED NG/ML
TROPONIN I SERPL-MCNC: <0.015 UG/L (ref 0–0.04)
UROBILINOGEN UR STRIP-MCNC: NORMAL MG/DL (ref 0–2)
WBC # BLD AUTO: 3.8 10E9/L (ref 4–11)

## 2019-09-25 PROCEDURE — 93005 ELECTROCARDIOGRAM TRACING: CPT

## 2019-09-25 PROCEDURE — 25000128 H RX IP 250 OP 636: Performed by: NURSE PRACTITIONER

## 2019-09-25 PROCEDURE — 85379 FIBRIN DEGRADATION QUANT: CPT | Performed by: NURSE PRACTITIONER

## 2019-09-25 PROCEDURE — 85025 COMPLETE CBC W/AUTO DIFF WBC: CPT | Performed by: NURSE PRACTITIONER

## 2019-09-25 PROCEDURE — 36415 COLL VENOUS BLD VENIPUNCTURE: CPT | Performed by: NURSE PRACTITIONER

## 2019-09-25 PROCEDURE — 96374 THER/PROPH/DIAG INJ IV PUSH: CPT

## 2019-09-25 PROCEDURE — 81025 URINE PREGNANCY TEST: CPT | Performed by: NURSE PRACTITIONER

## 2019-09-25 PROCEDURE — 80306 DRUG TEST PRSMV INSTRMNT: CPT | Performed by: NURSE PRACTITIONER

## 2019-09-25 PROCEDURE — 25000132 ZZH RX MED GY IP 250 OP 250 PS 637: Performed by: NURSE PRACTITIONER

## 2019-09-25 PROCEDURE — 93010 ELECTROCARDIOGRAM REPORT: CPT | Performed by: INTERNAL MEDICINE

## 2019-09-25 PROCEDURE — 83690 ASSAY OF LIPASE: CPT | Performed by: NURSE PRACTITIONER

## 2019-09-25 PROCEDURE — 86140 C-REACTIVE PROTEIN: CPT | Performed by: NURSE PRACTITIONER

## 2019-09-25 PROCEDURE — 99285 EMERGENCY DEPT VISIT HI MDM: CPT | Mod: 25

## 2019-09-25 PROCEDURE — 81003 URINALYSIS AUTO W/O SCOPE: CPT | Performed by: NURSE PRACTITIONER

## 2019-09-25 PROCEDURE — 84484 ASSAY OF TROPONIN QUANT: CPT | Performed by: NURSE PRACTITIONER

## 2019-09-25 PROCEDURE — 71046 X-RAY EXAM CHEST 2 VIEWS: CPT | Mod: TC

## 2019-09-25 PROCEDURE — 80053 COMPREHEN METABOLIC PANEL: CPT | Performed by: NURSE PRACTITIONER

## 2019-09-25 PROCEDURE — 99285 EMERGENCY DEPT VISIT HI MDM: CPT | Mod: Z6 | Performed by: NURSE PRACTITIONER

## 2019-09-25 RX ORDER — LORAZEPAM 2 MG/ML
1 INJECTION INTRAMUSCULAR ONCE
Status: COMPLETED | OUTPATIENT
Start: 2019-09-25 | End: 2019-09-25

## 2019-09-25 RX ORDER — ASPIRIN 81 MG/1
324 TABLET, CHEWABLE ORAL ONCE
Status: COMPLETED | OUTPATIENT
Start: 2019-09-25 | End: 2019-09-25

## 2019-09-25 RX ORDER — HYDROXYZINE PAMOATE 25 MG/1
25 CAPSULE ORAL 3 TIMES DAILY PRN
Qty: 12 CAPSULE | Refills: 0 | Status: SHIPPED | OUTPATIENT
Start: 2019-09-25 | End: 2019-09-27

## 2019-09-25 RX ADMIN — LORAZEPAM 1 MG: 2 INJECTION, SOLUTION INTRAMUSCULAR; INTRAVENOUS at 11:37

## 2019-09-25 RX ADMIN — ASPIRIN 81 MG 324 MG: 81 TABLET ORAL at 10:28

## 2019-09-25 ASSESSMENT — ENCOUNTER SYMPTOMS
PSYCHIATRIC NEGATIVE: 1
CHEST TIGHTNESS: 1
VOMITING: 0
ABDOMINAL PAIN: 1
NAUSEA: 1
ABDOMINAL DISTENTION: 0
PALPITATIONS: 0
COUGH: 1
CONSTITUTIONAL NEGATIVE: 1
MUSCULOSKELETAL NEGATIVE: 1
NEUROLOGICAL NEGATIVE: 1
SHORTNESS OF BREATH: 1
CONSTIPATION: 0
DIARRHEA: 0
RECTAL PAIN: 0

## 2019-09-25 NOTE — ED NOTES
Pt was reluctant at first to take the Ativan, pt is anxious about the ativan dropping her HR, currently HR is 80's. Pt reported she's had it in the past.  Ativan was given.  SO at bedside, call light in hand

## 2019-09-25 NOTE — ED PROVIDER NOTES
History     Chief Complaint   Patient presents with     Chest Pain     Cough     Abdominal Pain     HPI  Shy White is a 29 year old female who presents for chest pain, sob. Reports symptoms started 3 days ago and have been intermittent but worse this am. She took ibuprofen without relief of her symptoms. She reports pmh of WPW and EDS with ablation in 2014. She denies fever, chills.     Allergies:  Allergies   Allergen Reactions     Azithromycin Swelling     Amoxicillin      Target lesions     Erythromycin GI Disturbance     target lesions     Keflex [Cephalexin Hcl]      Penicillins      Potassium Difficulty breathing     Sulfa Drugs      Prednisone Anxiety       Problem List:    Patient Active Problem List    Diagnosis Date Noted     EDS (Praneeth-Danlos syndrome) 04/09/2018     Priority: Medium     ACP (advance care planning) 05/11/2016     Priority: Medium     Advance Care Planning 5/11/2016: ACP Review of Chart / Resources Provided:  Reviewed chart for advance care plan.  Shy Catherine has no plan or code status on file. Discussed available resources and provided with information. Confirmed code status reflects current choices pending further ACP discussions.  Confirmed/documented legally designated decision makers.  Added by Cristina Burnett              Past Medical History:    Past Medical History:   Diagnosis Date     Chronic pain      EDS (Praneeth-Danlos syndrome) 4/9/2018     Interstitial cystitis      Ninfa-Parkinson-White (WPW) syndrome 2/29/2016       Past Surgical History:    Past Surgical History:   Procedure Laterality Date     ABDOMEN SURGERY  2014    laproscopy, endometriosis     CARDIAC SURGERY  3-2016    ablation     CHOLECYSTECTOMY  04/2019     DILATION AND CURETTAGE  7/18/2013    Procedure: DILATION AND CURETTAGE;;  Surgeon: Latesha Camacho MD;  Location: [facility]     LAPAROSCOPY DIAGNOSTIC (GYN)  7/18/2013    Procedure: LAPAROSCOPY DIAGNOSTIC (GYN);  DIAGNOSTIC LAPAROSCOPY, Dilation &  Curettage;  Surgeon: Latesha Camacho MD;  Location: [facility]     LAPAROSCOPY DIAGNOSTIC CHILD       ORTHOPEDIC SURGERY  12/17/15    hp surgery      ORTHOPEDIC SURGERY Right 11/08/2017    rt femur plate and screw removal     TONSILLECTOMY       TYMPANOPLASTY CHILD       wisdom teeth resection         Family History:    Family History   Problem Relation Age of Onset     Crohn's Disease Mother      Arthritis Mother      Autoimmune Disease Mother      Coronary Artery Disease Father      Thyroid Disease Sister      Thyroid Disease Daughter        Social History:  Marital Status:   [2]  Social History     Tobacco Use     Smoking status: Former Smoker     Packs/day: 0.50     Last attempt to quit: 7/24/2016     Years since quitting: 3.1     Smokeless tobacco: Never Used     Tobacco comment: 3-4 cigs per daydown to 2 a day   Substance Use Topics     Alcohol use: No     Drug use: No        Medications:    hydrOXYzine (VISTARIL) 25 MG capsule          Review of Systems   Constitutional: Negative.    HENT: Positive for congestion.    Respiratory: Positive for cough, chest tightness and shortness of breath.    Cardiovascular: Positive for chest pain. Negative for palpitations and leg swelling.   Gastrointestinal: Positive for abdominal pain and nausea. Negative for abdominal distention, constipation, diarrhea, rectal pain and vomiting.   Genitourinary: Negative.    Musculoskeletal: Negative.    Skin: Negative.    Neurological: Negative.    Psychiatric/Behavioral: Negative.        Physical Exam   BP: 112/67  Heart Rate: 63  Temp: 96.9  F (36.1  C)  Resp: 16  SpO2: 99 %      Physical Exam  Vitals signs and nursing note reviewed.   Constitutional:       General: She is not in acute distress.     Appearance: She is well-developed and normal weight. She is not ill-appearing.   HENT:      Head: Normocephalic and atraumatic.   Eyes:      Pupils: Pupils are equal, round, and reactive to light.   Neck:      Musculoskeletal: Normal  range of motion and neck supple.   Cardiovascular:      Rate and Rhythm: Normal rate and regular rhythm.      Pulses:           Carotid pulses are 2+ on the right side and 2+ on the left side.       Radial pulses are 2+ on the right side and 2+ on the left side.        Dorsalis pedis pulses are 2+ on the right side and 2+ on the left side.        Posterior tibial pulses are 2+ on the right side and 2+ on the left side.      Heart sounds: Normal heart sounds.   Pulmonary:      Effort: Pulmonary effort is normal. No tachypnea or accessory muscle usage.      Breath sounds: Examination of the right-lower field reveals decreased breath sounds. Examination of the left-lower field reveals decreased breath sounds. Decreased breath sounds present.   Chest:      Chest wall: No tenderness.   Abdominal:      General: Bowel sounds are normal.      Palpations: Abdomen is soft.      Tenderness: There is no tenderness.   Musculoskeletal: Normal range of motion.   Skin:     General: Skin is warm and dry.      Capillary Refill: Capillary refill takes less than 2 seconds.   Neurological:      General: No focal deficit present.      Mental Status: She is alert and oriented to person, place, and time.   Psychiatric:         Behavior: Behavior normal.         ED Course     ED Course as of Sep 25 1210   Wed Sep 25, 2019   1127 Pt complaining of worsening symptoms, tingling in her arms and feeling sob that started when her monitor alarmed. States I think Im having a panic attack      1201 Pt feeling better after ativan. I discussed all results and diff diagnosis with the patient and discussed possibly due to anxiety. Pt states she has previously been diagnosed with anxiety and she had rx for xanax and ativan. But when she was diagnosed with WPW was told she did not have anxiety it was due to her heart. Informed her her EKG is normal, no evidence of WPW. She had normal D-dimer to r/o PE, normal CRP to r/o pericarditis, normal CXR and normal  troponin. Will discharge with rx for vistaril prn for anxiety but strongly recommend she contact her pcp for follow up and possible rx for serotonin specific reuptake inhibitor and or other medications for her anxiety. Return to ED for new or worsening symptoms.         Procedures               EKG Interpretation:      Interpreted by Rachel Montes NP  Time reviewed: 1015  Symptoms at time of EKG: chest pain    Rhythm: normal sinus   Rate: 100   Axis: normal  Ectopy: none  Conduction: prolonged  QT  ST Segments/ T Waves: non specific changes  Q Waves: none  Comparison to prior: Unchanged from 02/12/18    Clinical Impression: normal EKG                 Results for orders placed or performed during the hospital encounter of 09/25/19 (from the past 24 hour(s))   CBC with platelets differential   Result Value Ref Range    WBC 3.8 (L) 4.0 - 11.0 10e9/L    RBC Count 4.96 3.8 - 5.2 10e12/L    Hemoglobin 13.6 11.7 - 15.7 g/dL    Hematocrit 41.3 35.0 - 47.0 %    MCV 83 78 - 100 fl    MCH 27.4 26.5 - 33.0 pg    MCHC 32.9 31.5 - 36.5 g/dL    RDW 15.6 (H) 10.0 - 15.0 %    Platelet Count 315 150 - 450 10e9/L    Diff Method Automated Method     % Neutrophils 42.1 %    % Lymphocytes 47.0 %    % Monocytes 6.6 %    % Eosinophils 3.2 %    % Basophils 1.1 %    % Immature Granulocytes 0.0 %    Nucleated RBCs 0 0 /100    Absolute Neutrophil 1.6 1.6 - 8.3 10e9/L    Absolute Lymphocytes 1.8 0.8 - 5.3 10e9/L    Absolute Monocytes 0.3 0.0 - 1.3 10e9/L    Absolute Eosinophils 0.1 0.0 - 0.7 10e9/L    Absolute Basophils 0.0 0.0 - 0.2 10e9/L    Abs Immature Granulocytes 0.0 0 - 0.4 10e9/L    Absolute Nucleated RBC 0.0    D-Dimer ([facility])   Result Value Ref Range    D-Dimer ng/mL <200 0 - 300 ng/ml D-DU   Comprehensive metabolic panel   Result Value Ref Range    Sodium 140 133 - 144 mmol/L    Potassium 3.9 3.4 - 5.3 mmol/L    Chloride 110 (H) 94 - 109 mmol/L    Carbon Dioxide 25 20 - 32 mmol/L    Anion Gap 5 3 - 14 mmol/L    Glucose 85 70 - 99  mg/dL    Urea Nitrogen 10 7 - 30 mg/dL    Creatinine 0.61 0.52 - 1.04 mg/dL    GFR Estimate >90 >60 mL/min/[1.73_m2]    GFR Estimate If Black >90 >60 mL/min/[1.73_m2]    Calcium 9.1 8.5 - 10.1 mg/dL    Bilirubin Total 0.6 0.2 - 1.3 mg/dL    Albumin 4.4 3.4 - 5.0 g/dL    Protein Total 7.6 6.8 - 8.8 g/dL    Alkaline Phosphatase 65 40 - 150 U/L    ALT 16 0 - 50 U/L    AST 6 0 - 45 U/L   Lipase   Result Value Ref Range    Lipase 91 73 - 393 U/L   Troponin I   Result Value Ref Range    Troponin I ES <0.015 0.000 - 0.045 ug/L   CRP inflammation   Result Value Ref Range    CRP Inflammation <2.9 0.0 - 8.0 mg/L   UA reflex to Microscopic and Culture   Result Value Ref Range    Color Urine Straw     Appearance Urine Clear     Glucose Urine Negative NEG^Negative mg/dL    Bilirubin Urine Negative NEG^Negative    Ketones Urine Negative NEG^Negative mg/dL    Specific Gravity Urine 1.007 1.003 - 1.035    Blood Urine Negative NEG^Negative    pH Urine 5.0 4.7 - 8.0 pH    Protein Albumin Urine Negative NEG^Negative mg/dL    Urobilinogen mg/dL Normal 0.0 - 2.0 mg/dL    Nitrite Urine Negative NEG^Negative    Leukocyte Esterase Urine Negative NEG^Negative    Source Midstream Urine    HCG qualitative urine (UPT)   Result Value Ref Range    HCG Qual Urine Negative NEG^Negative   Urine Drugs of Abuse Screen Panel 13   Result Value Ref Range    Cannabinoids (49-ryw-8-carboxy-9-THC) Detected, Abnormal Result (A) NDET^Not Detected ng/mL    Phencyclidine (Phencyclidine) Not Detected NDET^Not Detected ng/mL    Cocaine (Benzoylecgonine) Not Detected NDET^Not Detected ng/mL    Methamphetamine (d-Methamphetamine) Not Detected NDET^Not Detected ng/mL    Opiates (Morphine) Not Detected NDET^Not Detected ng/mL    Amphetamine (d-Amphetamine) Not Detected NDET^Not Detected ng/mL    Benzodiazepines (Nordiazepam) Not Detected NDET^Not Detected ng/mL    Tricyclic Antidepressants (Desipramine) Not Detected NDET^Not Detected ng/mL    Methadone (Methadone)  Not Detected NDET^Not Detected ng/mL    Barbiturates (Butalbital) Not Detected NDET^Not Detected ng/mL    Oxycodone (Oxycodone) Not Detected NDET^Not Detected ng/mL    Propoxyphene (Norpropoxyphene) Not Detected NDET^Not Detected ng/mL    Buprenorphine (Buprenorphine) Not Detected NDET^Not Detected ng/mL   XR Chest 2 Views    Narrative    PROCEDURE:  XR CHEST 2 VW    HISTORY:  chest pain.     COMPARISON:  2015    FINDINGS:   The cardiac silhouette is normal in size. The pulmonary vasculature is  normal.  There are nodular densities be seen between the anterior  aspects of the fifth and sixth ribs bilaterally most likely nipple  shadows. No pleural effusion or pneumothorax.      Impression    IMPRESSION: Probable bilateral nipple shadows.    JANA HOWARD MD       Medications   aspirin (ASA) chewable tablet 324 mg (324 mg Oral Given 9/25/19 1028)   LORazepam (ATIVAN) injection 1 mg (1 mg Intravenous Given 9/25/19 1137)       Assessments & Plan (with Medical Decision Making)     I have reviewed the nursing notes.    I have reviewed the findings, diagnosis, plan and need for follow up with the patient.      New Prescriptions    HYDROXYZINE (VISTARIL) 25 MG CAPSULE    Take 1 capsule (25 mg) by mouth 3 times daily as needed for anxiety       Final diagnoses:   Atypical chest pain   Anxiety       9/25/2019   [facility]     Rachel Montes NP  09/25/19 1210

## 2019-09-25 NOTE — TELEPHONE ENCOUNTER
Reason for ER/UC visit: Chest pain/anxiety    Prescription Received/Picked up from Pharmacy?: Hydroxyzine Any questions regarding your prescription?: no    Do you have any questions or concerns?: no    ER Recommends Follow-up By: ASAP    RN Recommendations: Patient called and was seen in the ER today 9/25/2019. ER recommended patient to be seen by PCP to go over medication options. Please advise as she would like to be seen ASAP.

## 2019-09-25 NOTE — ED TRIAGE NOTES
Pt to ED with spouse. Pt stated that she hasn't been feeling well for about a week.  Pt also having chest pains, pt sated she has hx of WPW and did have an ablation but it didn't help. Pt also has epigastric pain.

## 2019-09-25 NOTE — ED NOTES
Discharge instructions reviewed with patient and SO. Rx has been e-scribed to pharmacy of choice.  Pt has no questions or concerns.  Encouraged to return with new or worsening symptoms.  Copy of AVS in hand on discharge. Pt SO here to drive pt home

## 2019-09-25 NOTE — ED AVS SNAPSHOT
[facility]  [address] [address]  Phone:  [phone]                                    Shy White   MRN: [id number]    Department:  [facility]   Date of Visit:  9/25/2019           After Visit Summary Signature Page    I have received my discharge instructions, and my questions have been answered. I have discussed any challenges I see with this plan with the nurse or doctor.    ..........................................................................................................................................  Patient/Patient Representative Signature      ..........................................................................................................................................  Patient Representative Print Name and Relationship to Patient    ..................................................               ................................................  Date                                   Time    ..........................................................................................................................................  Reviewed by Signature/Title    ...................................................              ..............................................  Date                                               Time          22EPIC Rev 08/18

## 2019-09-26 NOTE — PROGRESS NOTES
Shy White is a 29 year old female who presents to clinic today for the following health issues:      ED/UC Followup:  Facility:  [facility]  Date of visit: 9/25/2019  Reason for visit: Chest pain, anxiety   Current Status: still feeling like she has a racing heart and shortness of breath. States having strange side effects from the medication they placed her on.                EKG Interpretation:       Interpreted by Rachel Montes NP  Time reviewed: 1015  Symptoms at time of EKG: chest pain     Rhythm: normal sinus   Rate: 100        Axis: normal  Ectopy: none  Conduction: prolonged  QT  ST Segments/ T Waves: non specific changes  Q Waves: none  Comparison to prior: Unchanged from 02/12/18  Clinical Impression: normal EKG            Depression and Anxiety Follow-Up  PTSD  Extensive cardiac issues with WPW, Praneeth Danlos syndrome  Anxious about her health history, cannot stop worrying - becomes tachycardic.      Additionally, she found her dead Grand mom - 5 years ago, who was in a state of advanced decomposition - given 90 day heat.  She has flashbacks, she has tremendous anxiety. Has never seen a counselor.           How are you doing with your depression since your last visit? Worsened More suicidal thoughts lately    How are you doing with your anxiety since your last visit?  Worsened increased anxiety    Are you having other symptoms that might be associated with depression or anxiety? Yes:  suicidal thoughts and panic attacks    Have you had a significant life event? OTHER: many, health concern, daily stress     Do you have any concerns with your use of alcohol or other drugs? No         Social History     Tobacco Use     Smoking status: Former Smoker     Packs/day: 0.50     Last attempt to quit: 7/24/2016     Years since quitting: 3.1     Smokeless tobacco: Never Used     Tobacco comment: 3-4 cigs per daydown to 2 a day   Substance Use Topics     Alcohol use: No     Drug use: No       PHQ-9 SCORE  11/1/2017 4/9/2018 6/3/2019   PHQ-9 Total Score 2 3 0     LOYDA-7 SCORE 11/1/2017 4/9/2018 6/3/2019   Total Score 6 2 0         Suicide Assessment Five-step Evaluation and Treatment (SAFE-T)      Patient Active Problem List   Diagnosis     ACP (advance care planning)     EDS (Praneeth-Danlos syndrome)     History of Ninfa-Parkinson-White (WPW) syndrome     History of cardiac radiofrequency ablation     Severe episode of recurrent major depressive disorder (H)     Anxiety     PTSD (post-traumatic stress disorder)     Atypical chest pain     Past Surgical History:   Procedure Laterality Date     ABDOMEN SURGERY  2014    laproscopy, endometriosis     CARDIAC SURGERY  3-2016    ablation     CHOLECYSTECTOMY  04/2019     DILATION AND CURETTAGE  7/18/2013    Procedure: DILATION AND CURETTAGE;;  Surgeon: Latesha Camacho MD;  Location: [facility]     LAPAROSCOPY DIAGNOSTIC (GYN)  7/18/2013    Procedure: LAPAROSCOPY DIAGNOSTIC (GYN);  DIAGNOSTIC LAPAROSCOPY, Dilation & Curettage;  Surgeon: Latesha Camacho MD;  Location: [facility]     LAPAROSCOPY DIAGNOSTIC CHILD       ORTHOPEDIC SURGERY  12/17/15    hp surgery      ORTHOPEDIC SURGERY Right 11/08/2017    rt femur plate and screw removal     TONSILLECTOMY       TYMPANOPLASTY CHILD       wisdom teeth resection         Social History     Tobacco Use     Smoking status: Former Smoker     Packs/day: 0.50     Last attempt to quit: 7/24/2016     Years since quitting: 3.1     Smokeless tobacco: Never Used     Tobacco comment: 3-4 cigs per daydown to 2 a day   Substance Use Topics     Alcohol use: No     Family History   Problem Relation Age of Onset     Crohn's Disease Mother      Arthritis Mother      Autoimmune Disease Mother      Coronary Artery Disease Father      Thyroid Disease Sister      Thyroid Disease Daughter          Current Outpatient Medications   Medication Sig Dispense Refill     escitalopram (LEXAPRO) 10 MG tablet Take 1 tablet (10 mg) by mouth daily 30 tablet 1     LORazepam  (ATIVAN) 0.5 MG tablet Take 1 tablet (0.5 mg) by mouth every 8 hours as needed for anxiety 30 tablet 1     Allergies   Allergen Reactions     Azithromycin Swelling     Amoxicillin      Target lesions     Erythromycin GI Disturbance     target lesions     Keflex [Cephalexin Hcl]      Penicillins      Potassium Difficulty breathing     Sulfa Drugs      Prednisone Anxiety     Recent Labs   Lab Test 09/25/19  1037 04/01/19 11/27/18 11/01/17  0909  12/04/15  1346   ALT 16 10 10  --   --   --    CR 0.61 0.76 0.76 0.61   < >  --    GFRESTIMATED >90 >60 >60 >90   < >  --    GFRESTBLACK >90  --   --  >90   < >  --    POTASSIUM 3.9 3.5 4.0 3.7   < >  --    TSH  --   --   --   --   --  0.55    < > = values in this interval not displayed.      BP Readings from Last 3 Encounters:   09/27/19 122/82   09/25/19 111/70   06/03/19 110/70    Wt Readings from Last 3 Encounters:   09/27/19 53.5 kg (117 lb 14.4 oz)   06/03/19 57.2 kg (126 lb)   04/09/18 59.8 kg (131 lb 12.8 oz)              Reviewed and updated as needed this visit by Provider         Review of Systems   ROS COMP: Constitutional, HEENT, cardiovascular, pulmonary, GI, , musculoskeletal, neuro, skin, endocrine and psych systems are negative, except as otherwise noted.        Objective    /82 (BP Location: Left arm, Patient Position: Sitting, Cuff Size: Adult Regular)   Pulse 109   Temp 97.7  F (36.5  C) (Tympanic)   Wt 53.5 kg (117 lb 14.4 oz)   SpO2 100%   BMI 20.24 kg/m    Body mass index is 20.24 kg/m .       Physical Exam   GENERAL: healthy, alert and no distress  NECK: no adenopathy, no asymmetry, masses, or scars and thyroid normal to palpation  RESP: lungs clear to auscultation - no rales, rhonchi or wheezes  CV: regular rate and rhythm, normal S1 S2, no S3 or S4, no murmur, click or rub, no peripheral edema and peripheral pulses strong  MS: no gross musculoskeletal defects noted, no edema  SKIN: no suspicious lesions or rashes  PSYCH: highly anxious,  tearful.  Adamantly denies SI.       Visit time:     Over 40 minutes  spent with the patient.   Greater than 50% of our visit time was spent face to face and included patient education and counseling regarding current conditions.   WPW, atypical chest pain, and anxiety - Zio ordered, anxiety plan on place - ER labs reviewed  Anxiety, PTSD, Depression-  Plan of care in place  Medication management, and planof care.    Detailed plan of care is provided, AVS printed  Visit Content:   Referrals: IM, Mental Health  Diagnostic testing reviewed  Reviewed appropriate outside records  Lab testing reviewed  Any medication adjustment has been reviewed  Health Maintenance   Updated as appropriate.  Record review completed         Results for orders placed or performed during the hospital encounter of 09/25/19   XR Chest 2 Views    Narrative    PROCEDURE:  XR CHEST 2 VW    HISTORY:  chest pain.     COMPARISON:  2015    FINDINGS:   The cardiac silhouette is normal in size. The pulmonary vasculature is  normal.  There are nodular densities be seen between the anterior  aspects of the fifth and sixth ribs bilaterally most likely nipple  shadows. No pleural effusion or pneumothorax.      Impression    IMPRESSION: Probable bilateral nipple shadows.    JANA HOWARD MD   CBC with platelets differential   Result Value Ref Range    WBC 3.8 (L) 4.0 - 11.0 10e9/L    RBC Count 4.96 3.8 - 5.2 10e12/L    Hemoglobin 13.6 11.7 - 15.7 g/dL    Hematocrit 41.3 35.0 - 47.0 %    MCV 83 78 - 100 fl    MCH 27.4 26.5 - 33.0 pg    MCHC 32.9 31.5 - 36.5 g/dL    RDW 15.6 (H) 10.0 - 15.0 %    Platelet Count 315 150 - 450 10e9/L    Diff Method Automated Method     % Neutrophils 42.1 %    % Lymphocytes 47.0 %    % Monocytes 6.6 %    % Eosinophils 3.2 %    % Basophils 1.1 %    % Immature Granulocytes 0.0 %    Nucleated RBCs 0 0 /100    Absolute Neutrophil 1.6 1.6 - 8.3 10e9/L    Absolute Lymphocytes 1.8 0.8 - 5.3 10e9/L    Absolute Monocytes 0.3 0.0 - 1.3  10e9/L    Absolute Eosinophils 0.1 0.0 - 0.7 10e9/L    Absolute Basophils 0.0 0.0 - 0.2 10e9/L    Abs Immature Granulocytes 0.0 0 - 0.4 10e9/L    Absolute Nucleated RBC 0.0    D-Dimer ([facility])   Result Value Ref Range    D-Dimer ng/mL <200 0 - 300 ng/ml D-DU   Comprehensive metabolic panel   Result Value Ref Range    Sodium 140 133 - 144 mmol/L    Potassium 3.9 3.4 - 5.3 mmol/L    Chloride 110 (H) 94 - 109 mmol/L    Carbon Dioxide 25 20 - 32 mmol/L    Anion Gap 5 3 - 14 mmol/L    Glucose 85 70 - 99 mg/dL    Urea Nitrogen 10 7 - 30 mg/dL    Creatinine 0.61 0.52 - 1.04 mg/dL    GFR Estimate >90 >60 mL/min/[1.73_m2]    GFR Estimate If Black >90 >60 mL/min/[1.73_m2]    Calcium 9.1 8.5 - 10.1 mg/dL    Bilirubin Total 0.6 0.2 - 1.3 mg/dL    Albumin 4.4 3.4 - 5.0 g/dL    Protein Total 7.6 6.8 - 8.8 g/dL    Alkaline Phosphatase 65 40 - 150 U/L    ALT 16 0 - 50 U/L    AST 6 0 - 45 U/L   Lipase   Result Value Ref Range    Lipase 91 73 - 393 U/L   Troponin I   Result Value Ref Range    Troponin I ES <0.015 0.000 - 0.045 ug/L   UA reflex to Microscopic and Culture   Result Value Ref Range    Color Urine Straw     Appearance Urine Clear     Glucose Urine Negative NEG^Negative mg/dL    Bilirubin Urine Negative NEG^Negative    Ketones Urine Negative NEG^Negative mg/dL    Specific Gravity Urine 1.007 1.003 - 1.035    Blood Urine Negative NEG^Negative    pH Urine 5.0 4.7 - 8.0 pH    Protein Albumin Urine Negative NEG^Negative mg/dL    Urobilinogen mg/dL Normal 0.0 - 2.0 mg/dL    Nitrite Urine Negative NEG^Negative    Leukocyte Esterase Urine Negative NEG^Negative    Source Midstream Urine    HCG qualitative urine (UPT)   Result Value Ref Range    HCG Qual Urine Negative NEG^Negative   CRP inflammation   Result Value Ref Range    CRP Inflammation <2.9 0.0 - 8.0 mg/L   Urine Drugs of Abuse Screen Panel 13   Result Value Ref Range    Cannabinoids (53-wtp-4-carboxy-9-THC) Detected, Abnormal Result (A) NDET^Not Detected ng/mL     Phencyclidine (Phencyclidine) Not Detected NDET^Not Detected ng/mL    Cocaine (Benzoylecgonine) Not Detected NDET^Not Detected ng/mL    Methamphetamine (d-Methamphetamine) Not Detected NDET^Not Detected ng/mL    Opiates (Morphine) Not Detected NDET^Not Detected ng/mL    Amphetamine (d-Amphetamine) Not Detected NDET^Not Detected ng/mL    Benzodiazepines (Nordiazepam) Not Detected NDET^Not Detected ng/mL    Tricyclic Antidepressants (Desipramine) Not Detected NDET^Not Detected ng/mL    Methadone (Methadone) Not Detected NDET^Not Detected ng/mL    Barbiturates (Butalbital) Not Detected NDET^Not Detected ng/mL    Oxycodone (Oxycodone) Not Detected NDET^Not Detected ng/mL    Propoxyphene (Norpropoxyphene) Not Detected NDET^Not Detected ng/mL    Buprenorphine (Buprenorphine) Not Detected NDET^Not Detected ng/mL       Assessment & Plan     1. History of Ninfa-Parkinson-White (WPW) syndrome  - INTERNAL MEDICINE REFERRAL  - Zio Patch (RANGE) Adult Pediatric > 48 hours; Future    2. History of cardiac radiofrequency ablation  - INTERNAL MEDICINE REFERRAL    3. Severe episode of recurrent major depressive disorder, without psychotic features (H)  - MENTAL HEALTH REFERRAL  - Adult; Psychiatry and Medication Management; Psychiatry; Other: Not Listed - Enter Referral Details in Scheduling Comments Below; Patient call to schedule  - escitalopram (LEXAPRO) 10 MG tablet; Take 1 tablet (10 mg) by mouth daily  Dispense: 30 tablet; Refill: 1    4. Anxiety  - MENTAL HEALTH REFERRAL  - Adult; Psychiatry and Medication Management; Psychiatry; Other: Not Listed - Enter Referral Details in Scheduling Comments Below; Patient call to schedule  - escitalopram (LEXAPRO) 10 MG tablet; Take 1 tablet (10 mg) by mouth daily  Dispense: 30 tablet; Refill: 1  - LORazepam (ATIVAN) 0.5 MG tablet; Take 1 tablet (0.5 mg) by mouth every 8 hours as needed for anxiety  Dispense: 30 tablet; Refill: 1    5. PTSD (post-traumatic stress disorder)  - MENTAL  HEALTH REFERRAL  - Adult; Psychiatry and Medication Management; Psychiatry; Other: Not Listed - Enter Referral Details in Scheduling Comments Below; Patient call to schedule  - escitalopram (LEXAPRO) 10 MG tablet; Take 1 tablet (10 mg) by mouth daily  Dispense: 30 tablet; Refill: 1  - LORazepam (ATIVAN) 0.5 MG tablet; Take 1 tablet (0.5 mg) by mouth every 8 hours as needed for anxiety  Dispense: 30 tablet; Refill: 1    6. Atypical chest pain  - INTERNAL MEDICINE REFERRAL  - Zio Patch (RANGE) Adult  > 48 hours; Future       To ER as needed        Return for Internal Med - [facility] - Consult.   Follow-up with me in 4 weeks      Itzel Ayala CNP  [facility] [place] [facility]

## 2019-09-27 ENCOUNTER — OFFICE VISIT (OUTPATIENT)
Dept: FAMILY MEDICINE | Facility: OTHER | Age: 29
End: 2019-09-27
Attending: NURSE PRACTITIONER
Payer: COMMERCIAL

## 2019-09-27 VITALS
DIASTOLIC BLOOD PRESSURE: 82 MMHG | OXYGEN SATURATION: 100 % | TEMPERATURE: 97.7 F | HEART RATE: 109 BPM | SYSTOLIC BLOOD PRESSURE: 122 MMHG | WEIGHT: 117.9 LBS | BODY MASS INDEX: 20.24 KG/M2

## 2019-09-27 DIAGNOSIS — F33.2 SEVERE EPISODE OF RECURRENT MAJOR DEPRESSIVE DISORDER, WITHOUT PSYCHOTIC FEATURES (H): ICD-10-CM

## 2019-09-27 DIAGNOSIS — Z86.79 HISTORY OF WOLFF-PARKINSON-WHITE (WPW) SYNDROME: ICD-10-CM

## 2019-09-27 DIAGNOSIS — F41.9 ANXIETY: ICD-10-CM

## 2019-09-27 DIAGNOSIS — F43.10 PTSD (POST-TRAUMATIC STRESS DISORDER): ICD-10-CM

## 2019-09-27 DIAGNOSIS — Z98.890 HISTORY OF CARDIAC RADIOFREQUENCY ABLATION: ICD-10-CM

## 2019-09-27 DIAGNOSIS — R07.89 ATYPICAL CHEST PAIN: ICD-10-CM

## 2019-09-27 PROCEDURE — 99215 OFFICE O/P EST HI 40 MIN: CPT | Performed by: NURSE PRACTITIONER

## 2019-09-27 RX ORDER — LORAZEPAM 0.5 MG/1
0.5 TABLET ORAL EVERY 8 HOURS PRN
Qty: 30 TABLET | Refills: 1 | Status: SHIPPED | OUTPATIENT
Start: 2019-09-27 | End: 2019-10-28 | Stop reason: ALTCHOICE

## 2019-09-27 RX ORDER — ESCITALOPRAM OXALATE 10 MG/1
10 TABLET ORAL DAILY
Qty: 30 TABLET | Refills: 1 | Status: SHIPPED | OUTPATIENT
Start: 2019-09-27 | End: 2022-05-25

## 2019-09-27 ASSESSMENT — PAIN SCALES - GENERAL: PAINLEVEL: NO PAIN (0)

## 2019-09-27 NOTE — PATIENT INSTRUCTIONS
Assessment & Plan     1. History of Ninfa-Parkinson-White (WPW) syndrome  - INTERNAL MEDICINE REFERRAL  - Zio Patch (RANGE) Adult Pediatric > 48 hours; Future    2. History of cardiac radiofrequency ablation  - INTERNAL MEDICINE REFERRAL    3. Severe episode of recurrent major depressive disorder, without psychotic features (H)  - MENTAL HEALTH REFERRAL  - Adult; Psychiatry and Medication Management; Psychiatry; Other: Not Listed - Enter Referral Details in Scheduling Comments Below; Patient call to schedule  - escitalopram (LEXAPRO) 10 MG tablet; Take 1 tablet (10 mg) by mouth daily  Dispense: 30 tablet; Refill: 1    4. Anxiety  - MENTAL HEALTH REFERRAL  - Adult; Psychiatry and Medication Management; Psychiatry; Other: Not Listed - Enter Referral Details in Scheduling Comments Below; Patient call to schedule  - escitalopram (LEXAPRO) 10 MG tablet; Take 1 tablet (10 mg) by mouth daily  Dispense: 30 tablet; Refill: 1  - LORazepam (ATIVAN) 0.5 MG tablet; Take 1 tablet (0.5 mg) by mouth every 8 hours as needed for anxiety  Dispense: 30 tablet; Refill: 1    5. PTSD (post-traumatic stress disorder)  - MENTAL HEALTH REFERRAL  - Adult; Psychiatry and Medication Management; Psychiatry; Other: Not Listed - Enter Referral Details in Scheduling Comments Below; Patient call to schedule  - escitalopram (LEXAPRO) 10 MG tablet; Take 1 tablet (10 mg) by mouth daily  Dispense: 30 tablet; Refill: 1  - LORazepam (ATIVAN) 0.5 MG tablet; Take 1 tablet (0.5 mg) by mouth every 8 hours as needed for anxiety  Dispense: 30 tablet; Refill: 1    6. Atypical chest pain  - INTERNAL MEDICINE REFERRAL  - Zio Patch (RANGE) Adult  > 48 hours; Future       To ER as needed        Return for Internal Med - [facility] - Consult.   Follow-up with me in 4 weeks    Itzel Ayala CNP  [facility] [place] [facility]

## 2019-09-27 NOTE — LETTER
My Depression Action Plan  Name: Shy White   Date of Birth 1990  Date: 9/27/2019    My doctor: Itzel Ayala   My clinic: [facility]  [address]  [place] [address]  [phone]          GREEN    ZONE   Good Control    What it looks like:     Things are going generally well. You have normal up s and down s. You may even feel depressed from time to time, but bad moods usually last less than a day.   What you need to do:  1. Continue to care for yourself (see self care plan)  2. Check your depression survival kit and update it as needed  3. Follow your physician s recommendations including any medication.  4. Do not stop taking medication unless you consult with your physician first.           YELLOW         ZONE Getting Worse    What it looks like:     Depression is starting to interfere with your life.     It may be hard to get out of bed; you may be starting to isolate yourself from others.    Symptoms of depression are starting to last most all day and this has happened for several days.     You may have suicidal thoughts but they are not constant.   What you need to do:     1. Call your care team, your response to treatment will improve if you keep your care team informed of your progress. Yellow periods are signs an adjustment may need to be made.     2. Continue your self-care, even if you have to fake it!    3. Talk to someone in your support network    4. Open up your depression survival kit           RED    ZONE Medical Alert - Get Help    What it looks like:     Depression is seriously interfering with your life.     You may experience these or other symptoms: You can t get out of bed most days, can t work or engage in other necessary activities, you have trouble taking care of basic hygiene, or basic responsibilities, thoughts of suicide or death that will not go away, self-injurious behavior.     What you need to do:  1. Call your care team  and request a same-day appointment. If they are not available (weekends or after hours) call your local crisis line, emergency room or 911.            Depression Self Care Plan / Survival Kit    Self-Care for Depression  Here s the deal. Your body and mind are really not as separate as most people think.  What you do and think affects how you feel and how you feel influences what you do and think. This means if you do things that people who feel good do, it will help you feel better.  Sometimes this is all it takes.  There is also a place for medication and therapy depending on how severe your depression is, so be sure to consult with your medical provider and/ or Behavioral Health Consultant if your symptoms are worsening or not improving.     In order to better manage my stress, I will:    Exercise  Get some form of exercise, every day. This will help reduce pain and release endorphins, the  feel good  chemicals in your brain. This is almost as good as taking antidepressants!  This is not the same as joining a gym and then never going! (they count on that by the way ) It can be as simple as just going for a walk or doing some gardening, anything that will get you moving.      Hygiene   Maintain good hygiene (Get out of bed in the morning, Make your bed, Brush your teeth, Take a shower, and Get dressed like you were going to work, even if you are unemployed).  If your clothes don't fit try to get ones that do.    Diet  I will strive to eat foods that are good for me, drink plenty of water, and avoid excessive sugar, caffeine, alcohol, and other mood-altering substances.  Some foods that are helpful in depression are: complex carbohydrates, B vitamins, flaxseed, fish or fish oil, fresh fruits and vegetables.    Psychotherapy  I agree to participate in Individual Therapy (if recommended).    Medication  If prescribed medications, I agree to take them.  Missing doses can result in serious side effects.  I understand  that drinking alcohol, or other illicit drug use, may cause potential side effects.  I will not stop my medication abruptly without first discussing it with my provider.    Staying Connected With Others  I will stay in touch with my friends, family members, and my primary care provider/team.    Use your imagination  Be creative.  We all have a creative side; it doesn t matter if it s oil painting, sand castles, or mud pies! This will also kick up the endorphins.    Witness Beauty  (AKA stop and smell the roses) Take a look outside, even in mid-winter. Notice colors, textures. Watch the squirrels and birds.     Service to others  Be of service to others.  There is always someone else in need.  By helping others we can  get out of ourselves  and remember the really important things.  This also provides opportunities for practicing all the other parts of the program.    Humor  Laugh and be silly!  Adjust your TV habits for less news and crime-drama and more comedy.    Control your stress  Try breathing deep, massage therapy, biofeedback, and meditation. Find time to relax each day.     My support system    Clinic Contact:  Phone number:    Contact 1:  Phone number:    Contact 2:  Phone number:    Faith/:  Phone number:    Therapist:  Phone number:    Local crisis center:    Phone number:    Other community support:  Phone number:

## 2019-09-27 NOTE — NURSING NOTE
"Chief Complaint   Patient presents with     Hospital F/U       Initial /82 (BP Location: Left arm, Patient Position: Sitting, Cuff Size: Adult Regular)   Pulse 109   Temp 97.7  F (36.5  C) (Tympanic)   Wt 53.5 kg (117 lb 14.4 oz)   SpO2 100%   BMI 20.24 kg/m   Estimated body mass index is 20.24 kg/m  as calculated from the following:    Height as of 4/9/18: 1.626 m (5' 4\").    Weight as of this encounter: 53.5 kg (117 lb 14.4 oz).  Medication Reconciliation: complete  Ashley A. Lechevalier, LPN  "

## 2019-09-27 NOTE — Clinical Note
Referring for a consult - history of WPW, had ablation - now has episodes of tachycardia, with atypical chest pain. Highly anxious

## 2019-09-30 ENCOUNTER — HOSPITAL ENCOUNTER (OUTPATIENT)
Dept: CARDIOLOGY | Facility: HOSPITAL | Age: 29
Discharge: HOME | End: 2019-09-30
Attending: NURSE PRACTITIONER | Admitting: NURSE PRACTITIONER
Payer: COMMERCIAL

## 2019-09-30 DIAGNOSIS — R07.89 ATYPICAL CHEST PAIN: ICD-10-CM

## 2019-09-30 DIAGNOSIS — Z86.79 HISTORY OF WOLFF-PARKINSON-WHITE (WPW) SYNDROME: ICD-10-CM

## 2019-09-30 PROCEDURE — 0296T ZIO PATCH HOLTER ADULT PEDIATRIC GREATER THAN 48 HRS: CPT | Mod: TC

## 2019-10-08 ENCOUNTER — OFFICE VISIT (OUTPATIENT)
Dept: OBGYN | Facility: OTHER | Age: 29
End: 2019-10-08
Attending: OBSTETRICS & GYNECOLOGY
Payer: COMMERCIAL

## 2019-10-08 VITALS
HEIGHT: 64 IN | BODY MASS INDEX: 19.63 KG/M2 | DIASTOLIC BLOOD PRESSURE: 62 MMHG | SYSTOLIC BLOOD PRESSURE: 98 MMHG | WEIGHT: 115 LBS | HEART RATE: 72 BPM

## 2019-10-08 DIAGNOSIS — Z12.4 SCREENING FOR CERVICAL CANCER: Primary | ICD-10-CM

## 2019-10-08 DIAGNOSIS — N75.0 BARTHOLIN'S DUCT CYST: ICD-10-CM

## 2019-10-08 PROCEDURE — 99214 OFFICE O/P EST MOD 30 MIN: CPT | Mod: 57 | Performed by: OBSTETRICS & GYNECOLOGY

## 2019-10-08 PROCEDURE — G0123 SCREEN CERV/VAG THIN LAYER: HCPCS | Performed by: OBSTETRICS & GYNECOLOGY

## 2019-10-08 ASSESSMENT — MIFFLIN-ST. JEOR: SCORE: 1236.64

## 2019-10-08 ASSESSMENT — PAIN SCALES - GENERAL: PAINLEVEL: MILD PAIN (2)

## 2019-10-08 NOTE — NURSING NOTE
"Chief Complaint   Patient presents with     Consult     [facility]       Initial BP 98/62 (BP Location: Left arm, Patient Position: Sitting, Cuff Size: Adult Regular)   Pulse 72   Ht 1.626 m (5' 4\")   Wt 52.2 kg (115 lb)   LMP 09/21/2019 (Exact Date)   Breastfeeding? No   BMI 19.74 kg/m   Estimated body mass index is 19.74 kg/m  as calculated from the following:    Height as of this encounter: 1.626 m (5' 4\").    Weight as of this encounter: 52.2 kg (115 lb).  Medication Reconciliation: complete  NUNO PANDYA LPN    "

## 2019-10-08 NOTE — LETTER
October 15, 2019          Shy White  [address]  [place] [address]        Dear Shy,       Thank you for coming to the [facility]. This letter is to inform you that your pap test  was normal. Please call the nurse at [phone], if you have questions pertaining to your results.               Sincerely,        Thiago Downey MD/ Johana ADAMS

## 2019-10-08 NOTE — PROGRESS NOTES
CC:  Consult from Dyan Ayala for labial cyst  HPI:  Shy White is a 29 year old female P2. Patient's last menstrual period was 09/21/2019 (exact date)..  Menses are Regular lasting 6-7 days with nml flow. She has noted for 7 years a left labial cyst that is always present but fluctuates in size.  Occasional painful/hard to sit.  No previous treatment. Denies abnormal discharge or STD concerns.     Intermenstrual bleeding: No  Post-coital bleeding: No  Previous work-up:No  Contraception: condome  Abnormal Pap: No  Dysmenorrhea: No  Pelvic pain:No  Dyspareunia: No    Past GYN history:        Last PAP smear:  Normal  2016    Patients records are available and reviewed at today's visit.    Past Medical History:   Diagnosis Date     Anxiety 9/27/2019     Atypical chest pain 9/27/2019     Chronic pain      EDS (Praneeth-Danlos syndrome) 4/9/2018     History of cardiac radiofrequency ablation 9/27/2019     History of Ninfa-Parkinson-White (WPW) syndrome 9/27/2019     Interstitial cystitis      PTSD (post-traumatic stress disorder) 9/27/2019     Severe episode of recurrent major depressive disorder (H) 9/27/2019     Ninfa-Parkinson-White (WPW) syndrome 2/29/2016       Past Surgical History:   Procedure Laterality Date     ABDOMEN SURGERY  2014    laproscopy, endometriosis     CARDIAC SURGERY  3-2016    ablation     CHOLECYSTECTOMY  04/2019     DILATION AND CURETTAGE  7/18/2013    Procedure: DILATION AND CURETTAGE;;  Surgeon: Latesha Camacho MD;  Location: [facility]     LAPAROSCOPY DIAGNOSTIC (GYN)  7/18/2013    Procedure: LAPAROSCOPY DIAGNOSTIC (GYN);  DIAGNOSTIC LAPAROSCOPY, Dilation & Curettage;  Surgeon: Latesha Caamcho MD;  Location: [facility]     LAPAROSCOPY DIAGNOSTIC CHILD       ORTHOPEDIC SURGERY  12/17/15     surgery      ORTHOPEDIC SURGERY Right 11/08/2017    rt femur plate and screw removal     TONSILLECTOMY       TYMPANOPLASTY CHILD       wisdom teeth resection         Family History   Problem Relation Age of Onset  "    Crohn's Disease Mother      Arthritis Mother      Autoimmune Disease Mother      Coronary Artery Disease Father      Thyroid Disease Sister      Thyroid Disease Daughter        Current Outpatient Medications   Medication Sig Dispense Refill     escitalopram (LEXAPRO) 10 MG tablet Take 1 tablet (10 mg) by mouth daily 30 tablet 1     LORazepam (ATIVAN) 0.5 MG tablet Take 1 tablet (0.5 mg) by mouth every 8 hours as needed for anxiety 30 tablet 1       Allergies: Azithromycin; Amoxicillin; Erythromycin; Keflex [cephalexin hcl]; Penicillins; Potassium; Sulfa drugs; and Prednisone    ROS:  CONSTITUTIONAL: NEGATIVE for fever, chills, change in weight  GI: NEGATIVE for nausea, abdominal pain, heartburn, or change in bowel habits  : NEGATIVE for frequency, dysuria, hematuria, vaginal discharge  PSYCHIATRIC: NEGATIVE for changes in mood or affect    EXAM:  Blood pressure 98/62, pulse 72, height 1.626 m (5' 4\"), weight 52.2 kg (115 lb), last menstrual period 09/21/2019, not currently breastfeeding.   BMI= Body mass index is 19.74 kg/m .  General - pleasant female in no acute distress.  Abdomen - soft, nontender, nondistended, no hepatosplenomegaly.  Pelvic - EG: normal adult female, 3cm left bartholins duct cyst.  No erythema or warmth.  BUS: within normal limits, Vagina: well rugated, no discharge, Cervix: no lesions or CMT, Uterus: firm,normal sized and nontender, Adnexae: no masses or tenderness.  Rectovaginal - deferred.  Musculoskeletal - no gross deformities or edema  Neurological - normal mental status.      ASSESSMENT/PLAN:  (Z12.4) Screening for cervical cancer  (primary encounter diagnosis)  Plan: A pap thin layer screen reflex to HPV if ASCUS         - recommend age 25 - 29         Left vulvar Bartholin's duct cyst (chronic)  Discussed drainage/durgical options with pt.  Recommend Bartholin's duct cyst drainage/marsupialization in OR under anesthesia.  Reviewed goals, risks, alternatives for planned procedure. "  Including risk of bleeding, infection, damage to nerves, blood vessels, bowel and bladder. Discussed recovery period and expected discomfort.. All questions were answered.  Preoperative instructions discussed.  Scheduled Nov 8.  Has IM appt scheduled with Dr. Lerma to f/u WPW prior to proceeding.  30 minutes were spent with the patient with greater than 50% of the visit spent in face-to-face counseling and coordination of care.

## 2019-10-11 DIAGNOSIS — N75.0 BARTHOLIN'S DUCT CYST: Primary | ICD-10-CM

## 2019-10-11 DIAGNOSIS — N75.0 CYST OF LEFT BARTHOLIN'S GLAND DUCT: ICD-10-CM

## 2019-10-15 LAB
COPATH REPORT: NORMAL
PAP: NORMAL

## 2019-10-25 NOTE — PROGRESS NOTES
[facility]  [address]  [facility] [address]  [phone]  Dept: [phone]    PRE-OP EVALUATION:  Today's date: 10/28/2019    Shy White (: 1990) presents for pre-operative evaluation assessment as requested by Dr. Downey.  She requires evaluation and anesthesia risk assessment prior to undergoing surgery/procedure for treatment of Bartholin's Cyst Removal .        Provider Role   Thiago Downey MD Primary    Procedure Laterality Anesthesia   LEFT BARTHOLINS DUCT CYST DRAINAGE AND MARSUPILIZATION Left General            Proposed Surgery/ Procedure: Left Bartholin's Cyst drainage and marsupialization.  Date of Surgery/ Procedure: 19  Time of Surgery/ Procedure: [facility]  Hospital/Surgical Facility: [facility]  Fax number for surgical facility: On File  Primary Physician: Itzel Ayala  Type of Anesthesia Anticipated: to be determined    Patient has a Health Care Directive or Living Will:  NO      1. NO - Do you have a history of heart attack, stroke, stent, bypass or surgery on an artery in the head, neck, heart or legs?  2. NO - Do you ever have any pain or discomfort in your chest?  3. NO - Do you have a history of  Heart Failure?  4. NO - Are you troubled by shortness of breath when: walking on the level, up a slight hill or at night?  5. NO - Do you currently have a cold, bronchitis or other respiratory infection?  6. NO - Do you have a cough, shortness of breath or wheezing?  7. NO - Do you sometimes get pains in the calves of your legs when you walk?  8. NO - Do you or anyone in your family have previous history of blood clots?  9. NO - Do you or does anyone in your family have a serious bleeding problem such as prolonged bleeding following surgeries or cuts?  10. NO - Have you ever had problems with anemia or been told to take iron pills?  11. NO - Have you had any abnormal blood loss such as black, tarry or bloody stools, or abnormal vaginal  bleeding?  12. NO - Have you ever had a blood transfusion?  13. NO - Have you or any of your relatives ever had problems with anesthesia?  14. NO - Do you have sleep apnea, excessive snoring or daytime drowsiness?  15. NO - Do you have any prosthetic heart valves?  16. NO - Do you have prosthetic joints?  17. NO - Is there any chance that you may be pregnant?      HPI:     HPI related to upcoming procedure:   Left Bartholin's Cyst      See problem list for active medical problems.  Problems all longstanding and stable, except as noted/documented.  See ROS for pertinent symptoms related to these conditions.      MEDICAL HISTORY:     Patient Active Problem List    Diagnosis Date Noted     History of Ninfa-Parkinson-White (WPW) syndrome 09/27/2019     Priority: Medium     History of cardiac radiofrequency ablation 09/27/2019     Priority: Medium     Severe episode of recurrent major depressive disorder (H) 09/27/2019     Priority: Medium     Anxiety 09/27/2019     Priority: Medium     PTSD (post-traumatic stress disorder) 09/27/2019     Priority: Medium     Atypical chest pain 09/27/2019     Priority: Medium     EDS (Praneeth-Danlos syndrome) 04/09/2018     Priority: Medium     ACP (advance care planning) 05/11/2016     Priority: Medium     Advance Care Planning 5/11/2016: ACP Review of Chart / Resources Provided:  Reviewed chart for advance care plan.  Shy Catherine has no plan or code status on file. Discussed available resources and provided with information. Confirmed code status reflects current choices pending further ACP discussions.  Confirmed/documented legally designated decision makers.  Added by Cristina Burnett            Past Medical History:   Diagnosis Date     Anxiety 9/27/2019     Atypical chest pain 9/27/2019     Chronic pain      EDS (Praneeth-Danlos syndrome) 4/9/2018     History of cardiac radiofrequency ablation 9/27/2019     History of Ninfa-Parkinson-White (WPW) syndrome 9/27/2019      Interstitial cystitis      PTSD (post-traumatic stress disorder) 9/27/2019     Severe episode of recurrent major depressive disorder (H) 9/27/2019     Ninfa-Parkinson-White (WPW) syndrome 2/29/2016       Past Surgical History:   Procedure Laterality Date     ABDOMEN SURGERY  2014    laproscopy, endometriosis     CARDIAC SURGERY  3-2016    ablation     CHOLECYSTECTOMY  04/2019     DILATION AND CURETTAGE  7/18/2013    Procedure: DILATION AND CURETTAGE;;  Surgeon: Latesha Camacho MD;  Location: [facility]     LAPAROSCOPY DIAGNOSTIC (GYN)  7/18/2013    Procedure: LAPAROSCOPY DIAGNOSTIC (GYN);  DIAGNOSTIC LAPAROSCOPY, Dilation & Curettage;  Surgeon: Latesha Camacho MD;  Location: [facility]     LAPAROSCOPY DIAGNOSTIC CHILD       ORTHOPEDIC SURGERY  12/17/15    hp surgery      ORTHOPEDIC SURGERY Right 11/08/2017    rt femur plate and screw removal     TONSILLECTOMY       TYMPANOPLASTY CHILD       wisdom teeth resection         Current Outpatient Medications   Medication Sig Dispense Refill     clonazePAM (KLONOPIN) 1 MG tablet TK SS TO 1 T PO BID PRN FOR ANXIETY  0     escitalopram (LEXAPRO) 10 MG tablet Take 1 tablet (10 mg) by mouth daily 30 tablet 1       OTC products: None, except as noted above      Allergies   Allergen Reactions     Azithromycin Swelling     Amoxicillin      Target lesions     Erythromycin GI Disturbance     target lesions     Keflex [Cephalexin Hcl]      Penicillins      Potassium Difficulty breathing     Sulfa Drugs      Prednisone Anxiety      Latex Allergy: NO      Social History     Tobacco Use     Smoking status: Former Smoker     Packs/day: 0.50     Last attempt to quit: 7/24/2016     Years since quitting: 3.2     Smokeless tobacco: Never Used     Tobacco comment: 3-4 cigs per daydown to 2 a day   Substance Use Topics     Alcohol use: No       History   Drug Use No       REVIEW OF SYSTEMS:   CONSTITUTIONAL: NEGATIVE for fever, chills, change in weight  INTEGUMENTARY/SKIN: NEGATIVE for worrisome rashes,  moles or lesions  EYES: NEGATIVE for vision changes or irritation  ENT/MOUTH: NEGATIVE for ear, mouth and throat problems  RESP: NEGATIVE for significant cough or SOB  BREAST: NEGATIVE for masses, tenderness or discharge  CV: NEGATIVE for chest pain, palpitations or peripheral edema  GI: NEGATIVE for nausea, abdominal pain, heartburn, or change in bowel habits  : NEGATIVE for frequency, dysuria, or hematuria  MUSCULOSKELETAL: NEGATIVE for significant arthralgias or myalgia  NEURO: NEGATIVE for weakness, dizziness or paresthesias  ENDOCRINE: NEGATIVE for temperature intolerance, skin/hair changes  HEME: NEGATIVE for bleeding problems  PSYCHIATRIC: NEGATIVE for changes in mood or affect      EXAM:   /58 (BP Location: Right arm, Patient Position: Sitting, Cuff Size: Adult Regular)   Pulse 56   Temp 97.9  F (36.6  C) (Tympanic)   Wt 54.4 kg (120 lb)   SpO2 98%   BMI 20.60 kg/m           GENERAL APPEARANCE: healthy, alert and no distress     EYES: EOMI, PERRL     HENT: ear canals and TM's normal and nose and mouth without ulcers or lesions     NECK: no adenopathy, no asymmetry, masses, or scars and thyroid normal to palpation     RESP: lungs clear to auscultation - no rales, rhonchi or wheezes     CV: regular rates and rhythm, normal S1 S2, no S3 or S4 and no murmur, click or rub     ABDOMEN:  soft, nontender, no HSM or masses and bowel sounds normal     MS: extremities normal- no gross deformities noted, no evidence of inflammation in joints, FROM in all extremities.     SKIN: no suspicious lesions or rashes     NEURO: Normal strength and tone, sensory exam grossly normal, mentation intact and speech normal     PSYCH: mentation appears normal. and affect normal/bright     LYMPHATICS: No cervical adenopathy    DIAGNOSTICS:     EKG attached  History of WPW  Cardiac testing in Epic      Results for orders placed or performed in visit on 10/28/19   HCG qualitative urine   Result Value Ref Range    HCG Qual  Urine Negative NEG^Negative   CBC with platelets differential   Result Value Ref Range    WBC 4.6 4.0 - 11.0 10e9/L    RBC Count 4.29 3.8 - 5.2 10e12/L    Hemoglobin 12.0 11.7 - 15.7 g/dL    Hematocrit 36.7 35.0 - 47.0 %    MCV 86 78 - 100 fl    MCH 28.0 26.5 - 33.0 pg    MCHC 32.7 31.5 - 36.5 g/dL    RDW 15.0 10.0 - 15.0 %    Platelet Count 297 150 - 450 10e9/L    % Neutrophils 48.8 %    % Lymphocytes 43.3 %    % Monocytes 5.6 %    % Eosinophils 1.7 %    % Basophils 0.6 %    Absolute Neutrophil 2.3 1.6 - 8.3 10e9/L    Absolute Lymphocytes 2.0 0.8 - 5.3 10e9/L    Absolute Monocytes 0.3 0.0 - 1.3 10e9/L    Absolute Eosinophils 0.1 0.0 - 0.7 10e9/L    Absolute Basophils 0.0 0.0 - 0.2 10e9/L    Diff Method Automated Method    Basic metabolic panel   Result Value Ref Range    Sodium 138 133 - 144 mmol/L    Potassium 3.4 3.4 - 5.3 mmol/L    Chloride 107 94 - 109 mmol/L    Carbon Dioxide 26 20 - 32 mmol/L    Anion Gap 5 3 - 14 mmol/L    Glucose 85 70 - 99 mg/dL    Urea Nitrogen 6 (L) 7 - 30 mg/dL    Creatinine 0.78 0.52 - 1.04 mg/dL    GFR Estimate >90 >60 mL/min/[1.73_m2]    GFR Estimate If Black >90 >60 mL/min/[1.73_m2]    Calcium 9.1 8.5 - 10.1 mg/dL             Recent Labs   Lab Test 09/25/19  1037 04/01/19 04/09/18  1657 11/01/17  0909   HGB 13.6  --   --  12.7 12.9     --   --  335 310   INR  --  1.1  --   --   --      --   --   --  141   POTASSIUM 3.9 3.5   < >  --  3.7   CR 0.61 0.76   < >  --  0.61    < > = values in this interval not displayed.        IMPRESSION:   Reason for surgery/procedure: Left Bartholin's Cyst    The proposed surgical procedure is considered LOW risk.    REVISED CARDIAC RISK INDEX  The patient has the following serious cardiovascular risks for perioperative complications such as (MI, PE, VFib and 3  AV Block):  No serious cardiac risks  INTERPRETATION: 0 risks: Class I (very low risk - 0.4% complication rate)    The patient has the following additional risks for perioperative  complications:  No identified additional risks        ICD-10-CM    1. Preop general physical exam Z01.818 HCG qualitative urine     CBC with platelets differential     Basic metabolic panel     EKG 12-lead complete w/read - (Clinic Performed)   2. Bartholin's cyst N75.0        RECOMMENDATIONS:         APPROVAL GIVEN to proceed with proposed procedure, without further diagnostic evaluation       Signed Electronically by: Itzel Ayala CNP    Copy of this evaluation report is provided to requesting physician.    [facility] Preop Guidelines    Revised Cardiac Risk Index

## 2019-10-28 ENCOUNTER — OFFICE VISIT (OUTPATIENT)
Dept: FAMILY MEDICINE | Facility: OTHER | Age: 29
End: 2019-10-28
Attending: NURSE PRACTITIONER
Payer: COMMERCIAL

## 2019-10-28 VITALS
OXYGEN SATURATION: 98 % | TEMPERATURE: 97.9 F | HEART RATE: 56 BPM | WEIGHT: 120 LBS | DIASTOLIC BLOOD PRESSURE: 58 MMHG | SYSTOLIC BLOOD PRESSURE: 102 MMHG | BODY MASS INDEX: 20.6 KG/M2

## 2019-10-28 DIAGNOSIS — N75.0 BARTHOLIN'S CYST: ICD-10-CM

## 2019-10-28 DIAGNOSIS — Z01.818 PREOP GENERAL PHYSICAL EXAM: Primary | ICD-10-CM

## 2019-10-28 LAB
BASOPHILS # BLD AUTO: 0 10E9/L (ref 0–0.2)
BASOPHILS NFR BLD AUTO: 0.6 %
DIFFERENTIAL METHOD BLD: NORMAL
EOSINOPHIL # BLD AUTO: 0.1 10E9/L (ref 0–0.7)
EOSINOPHIL NFR BLD AUTO: 1.7 %
ERYTHROCYTE [DISTWIDTH] IN BLOOD BY AUTOMATED COUNT: 15 % (ref 10–15)
HCG UR QL: NEGATIVE
HCT VFR BLD AUTO: 36.7 % (ref 35–47)
HGB BLD-MCNC: 12 G/DL (ref 11.7–15.7)
LYMPHOCYTES # BLD AUTO: 2 10E9/L (ref 0.8–5.3)
LYMPHOCYTES NFR BLD AUTO: 43.3 %
MCH RBC QN AUTO: 28 PG (ref 26.5–33)
MCHC RBC AUTO-ENTMCNC: 32.7 G/DL (ref 31.5–36.5)
MCV RBC AUTO: 86 FL (ref 78–100)
MONOCYTES # BLD AUTO: 0.3 10E9/L (ref 0–1.3)
MONOCYTES NFR BLD AUTO: 5.6 %
NEUTROPHILS # BLD AUTO: 2.3 10E9/L (ref 1.6–8.3)
NEUTROPHILS NFR BLD AUTO: 48.8 %
PLATELET # BLD AUTO: 297 10E9/L (ref 150–450)
RBC # BLD AUTO: 4.29 10E12/L (ref 3.8–5.2)
WBC # BLD AUTO: 4.6 10E9/L (ref 4–11)

## 2019-10-28 PROCEDURE — 93000 ELECTROCARDIOGRAM COMPLETE: CPT | Performed by: INTERNAL MEDICINE

## 2019-10-28 PROCEDURE — 80048 BASIC METABOLIC PNL TOTAL CA: CPT | Performed by: NURSE PRACTITIONER

## 2019-10-28 PROCEDURE — 99214 OFFICE O/P EST MOD 30 MIN: CPT | Mod: 25 | Performed by: NURSE PRACTITIONER

## 2019-10-28 PROCEDURE — 81025 URINE PREGNANCY TEST: CPT | Performed by: NURSE PRACTITIONER

## 2019-10-28 PROCEDURE — 85025 COMPLETE CBC W/AUTO DIFF WBC: CPT | Performed by: NURSE PRACTITIONER

## 2019-10-28 PROCEDURE — 36415 COLL VENOUS BLD VENIPUNCTURE: CPT | Performed by: NURSE PRACTITIONER

## 2019-10-28 RX ORDER — CLONAZEPAM 1 MG/1
TABLET ORAL
Refills: 0 | COMMUNITY
Start: 2019-10-11 | End: 2022-05-25

## 2019-10-28 ASSESSMENT — PAIN SCALES - GENERAL: PAINLEVEL: NO PAIN (0)

## 2019-10-28 NOTE — NURSING NOTE
"Chief Complaint   Patient presents with     Pre-Op Exam       Initial /58 (BP Location: Right arm, Patient Position: Sitting, Cuff Size: Adult Regular)   Pulse 56   Temp 97.9  F (36.6  C) (Tympanic)   Wt 54.4 kg (120 lb)   SpO2 98%   BMI 20.60 kg/m   Estimated body mass index is 20.6 kg/m  as calculated from the following:    Height as of 10/8/19: 1.626 m (5' 4\").    Weight as of this encounter: 54.4 kg (120 lb).  Medication Reconciliation: complete  Ashley A. Lechevalier, LPN  "

## 2019-10-29 LAB
ANION GAP SERPL CALCULATED.3IONS-SCNC: 5 MMOL/L (ref 3–14)
BUN SERPL-MCNC: 6 MG/DL (ref 7–30)
CALCIUM SERPL-MCNC: 9.1 MG/DL (ref 8.5–10.1)
CHLORIDE SERPL-SCNC: 107 MMOL/L (ref 94–109)
CO2 SERPL-SCNC: 26 MMOL/L (ref 20–32)
CREAT SERPL-MCNC: 0.78 MG/DL (ref 0.52–1.04)
GFR SERPL CREATININE-BSD FRML MDRD: >90 ML/MIN/{1.73_M2}
GLUCOSE SERPL-MCNC: 85 MG/DL (ref 70–99)
POTASSIUM SERPL-SCNC: 3.4 MMOL/L (ref 3.4–5.3)
SODIUM SERPL-SCNC: 138 MMOL/L (ref 133–144)

## 2019-10-29 NOTE — PROGRESS NOTES
Evaluation completed by Itzel Ayala NP 10/29/19 and available for your review via EMR/Epic portal.   EKG graphing to be faxed to [phone]    Tyson 10/29/19

## 2019-11-04 ENCOUNTER — ANESTHESIA EVENT (OUTPATIENT)
Dept: SURGERY | Facility: HOSPITAL | Age: 29
End: 2019-11-04
Payer: COMMERCIAL

## 2019-11-04 RX ORDER — MEPERIDINE HYDROCHLORIDE 25 MG/ML
12.5 INJECTION INTRAMUSCULAR; INTRAVENOUS; SUBCUTANEOUS
Status: CANCELLED | OUTPATIENT
Start: 2019-11-04

## 2019-11-04 RX ORDER — SODIUM CHLORIDE 9 MG/ML
INJECTION, SOLUTION INTRAVENOUS CONTINUOUS
Status: CANCELLED | OUTPATIENT
Start: 2019-11-04

## 2019-11-04 RX ORDER — FENTANYL CITRATE 50 UG/ML
25-50 INJECTION, SOLUTION INTRAMUSCULAR; INTRAVENOUS
Status: CANCELLED | OUTPATIENT
Start: 2019-11-04

## 2019-11-04 RX ORDER — ONDANSETRON 4 MG/1
4 TABLET, ORALLY DISINTEGRATING ORAL EVERY 30 MIN PRN
Status: CANCELLED | OUTPATIENT
Start: 2019-11-04

## 2019-11-04 RX ORDER — HYDROMORPHONE HYDROCHLORIDE 1 MG/ML
.3-.5 INJECTION, SOLUTION INTRAMUSCULAR; INTRAVENOUS; SUBCUTANEOUS EVERY 10 MIN PRN
Status: CANCELLED | OUTPATIENT
Start: 2019-11-04

## 2019-11-04 RX ORDER — NALOXONE HYDROCHLORIDE 0.4 MG/ML
.1-.4 INJECTION, SOLUTION INTRAMUSCULAR; INTRAVENOUS; SUBCUTANEOUS
Status: CANCELLED | OUTPATIENT
Start: 2019-11-04 | End: 2019-11-05

## 2019-11-04 RX ORDER — ONDANSETRON 2 MG/ML
4 INJECTION INTRAMUSCULAR; INTRAVENOUS EVERY 30 MIN PRN
Status: CANCELLED | OUTPATIENT
Start: 2019-11-04

## 2019-11-04 ASSESSMENT — LIFESTYLE VARIABLES: TOBACCO_USE: 1

## 2019-11-04 NOTE — ANESTHESIA PREPROCEDURE EVALUATION
Anesthesia Pre-Procedure Evaluation    Patient: Shy White   MRN: [id number] : 1990          Preoperative Diagnosis: Bartholin's duct cyst [N75.0]    Procedure(s):  LEFT BARTHOLINS DUCT CYST DRAINAGE AND MARSUPILIZATION    Past Medical History:   Diagnosis Date     Anxiety 2019     Atypical chest pain 2019     Chronic pain      EDS (Praneeth-Danlos syndrome) 2018     History of cardiac radiofrequency ablation 2019     History of Ninfa-Parkinson-White (WPW) syndrome 2019     Interstitial cystitis      PTSD (post-traumatic stress disorder) 2019     Severe episode of recurrent major depressive disorder (H) 2019     Ninfa-Parkinson-White (WPW) syndrome 2016     Past Surgical History:   Procedure Laterality Date     ABDOMEN SURGERY      laproscopy, endometriosis     CARDIAC SURGERY  3-    ablation     CHOLECYSTECTOMY  2019     DILATION AND CURETTAGE  2013    Procedure: DILATION AND CURETTAGE;;  Surgeon: Latesha Camacho MD;  Location: [facility]     LAPAROSCOPY DIAGNOSTIC (GYN)  2013    Procedure: LAPAROSCOPY DIAGNOSTIC (GYN);  DIAGNOSTIC LAPAROSCOPY, Dilation & Curettage;  Surgeon: Latesha Camacho MD;  Location: [facility]     LAPAROSCOPY DIAGNOSTIC CHILD       ORTHOPEDIC SURGERY  12/17/15    hp surgery      ORTHOPEDIC SURGERY Right 2017    rt femur plate and screw removal     TONSILLECTOMY       TYMPANOPLASTY CHILD       wisdom teeth resection         Anesthesia Evaluation     . Pt has had prior anesthetic.     No history of anesthetic complications          ROS/MED HX    ENT/Pulmonary:     (+)tobacco use, Current use 5 to 7 cig /day for 15 years packs/day  , . .    Neurologic:       Cardiovascular: Comment: History WPW  S/p cardiac ablation    (+) ----. : . . . :. Irregular Heartbeat/Palpitations, . Previous cardiac testing date:results:date: results:ECG reviewed date:10/28/19 results:SB with sinus arrhythmia with nonspecific T wave abnormality date:  results:          METS/Exercise Tolerance:  >4 METS   Hematologic:  - neg hematologic  ROS       Musculoskeletal:  - neg musculoskeletal ROS (+)  other musculoskeletal- Praneeth Danlos syndrome      GI/Hepatic:  - neg GI/hepatic ROS       Renal/Genitourinary:  - ROS Renal section negative       Endo:  - neg endo ROS       Psychiatric:     (+) psychiatric history depression, anxiety and other (comment) (PTSD)      Infectious Disease:  - neg infectious disease ROS       Malignancy:      - no malignancy   Other:    (+) No chance of pregnancy C-spine cleared: N/A, H/O Chronic Pain,no other significant disability                         Physical Exam  Normal systems: cardiovascular    Airway   Mallampati: I  TM distance: >3 FB  Neck ROM: full    Dental   (+) missing    Cardiovascular   Rhythm and rate: regular and normal      Pulmonary    breath sounds clear to auscultation            Lab Results   Component Value Date    WBC 4.6 10/28/2019    HGB 12.0 10/28/2019    HCT 36.7 10/28/2019     10/28/2019    CRP <2.9 09/25/2019    SED 7 04/09/2018     10/28/2019    POTASSIUM 3.4 10/28/2019    CHLORIDE 107 10/28/2019    CO2 26 10/28/2019    BUN 6 (L) 10/28/2019    CR 0.78 10/28/2019    GLC 85 10/28/2019    ANSHU 9.1 10/28/2019    MAG 1.8 01/03/2017    ALBUMIN 4.4 09/25/2019    PROTTOTAL 7.6 09/25/2019    ALT 16 09/25/2019    AST 6 09/25/2019    ALKPHOS 65 09/25/2019    BILITOTAL 0.6 09/25/2019    LIPASE 91 09/25/2019    INR 1.1 04/01/2019    TSH 0.55 12/04/2015    HCG Negative 10/28/2019       Preop Vitals  BP Readings from Last 3 Encounters:   10/28/19 102/58   10/08/19 98/62   09/27/19 122/82    Pulse Readings from Last 3 Encounters:   10/28/19 56   10/08/19 72   09/27/19 109      Resp Readings from Last 3 Encounters:   09/25/19 14   06/03/19 14   04/09/18 14    SpO2 Readings from Last 3 Encounters:   10/28/19 98%   09/27/19 100%   09/25/19 97%      Temp Readings from Last 1 Encounters:   10/28/19 97.9  F (36.6  C)  "(Tympanic)    Ht Readings from Last 1 Encounters:   10/08/19 1.626 m (5' 4\")      Wt Readings from Last 1 Encounters:   10/28/19 54.4 kg (120 lb)    Estimated body mass index is 20.6 kg/m  as calculated from the following:    Height as of 10/8/19: 1.626 m (5' 4\").    Weight as of 10/28/19: 54.4 kg (120 lb).       Anesthesia Plan      History & Physical Review  History and physical reviewed and following examination; no interval change.    ASA Status:  3 .    NPO Status:  > 8 hours    Plan for MAC with Intravenous and Propofol induction. Maintenance will be TIVA.    PONV prophylaxis:  Ondansetron (or other 5HT-3) and Dexamethasone or Solumedrol  H and P date 10/28/19        Postoperative Care  Postoperative pain management:  IV analgesics.      Consents  Anesthetic plan, risks, benefits and alternatives discussed with:  Patient..                 ASHLI Rosales CRNA  "

## 2019-11-08 ENCOUNTER — HOSPITAL ENCOUNTER (OUTPATIENT)
Facility: HOSPITAL | Age: 29
Discharge: HOME | End: 2019-11-08
Attending: OBSTETRICS & GYNECOLOGY | Admitting: OBSTETRICS & GYNECOLOGY
Payer: COMMERCIAL

## 2019-11-08 ENCOUNTER — ANESTHESIA (OUTPATIENT)
Dept: SURGERY | Facility: HOSPITAL | Age: 29
End: 2019-11-08
Payer: COMMERCIAL

## 2019-11-08 ENCOUNTER — APPOINTMENT (OUTPATIENT)
Dept: LAB | Facility: HOSPITAL | Age: 29
End: 2019-11-08
Attending: OBSTETRICS & GYNECOLOGY
Payer: COMMERCIAL

## 2019-11-08 VITALS
SYSTOLIC BLOOD PRESSURE: 111 MMHG | HEART RATE: 59 BPM | TEMPERATURE: 97.4 F | HEIGHT: 64 IN | OXYGEN SATURATION: 99 % | WEIGHT: 119.93 LBS | BODY MASS INDEX: 20.47 KG/M2 | DIASTOLIC BLOOD PRESSURE: 78 MMHG | RESPIRATION RATE: 16 BRPM

## 2019-11-08 DIAGNOSIS — Z98.890 POSTOPERATIVE STATE: Primary | ICD-10-CM

## 2019-11-08 LAB — HCG UR QL: NEGATIVE

## 2019-11-08 PROCEDURE — 56740 EXC BARTHOLINS GLAND/CYST: CPT | Performed by: NURSE ANESTHETIST, CERTIFIED REGISTERED

## 2019-11-08 PROCEDURE — 37000008 ZZH ANESTHESIA TECHNICAL FEE, 1ST 30 MIN: Performed by: OBSTETRICS & GYNECOLOGY

## 2019-11-08 PROCEDURE — 37000009 ZZH ANESTHESIA TECHNICAL FEE, EACH ADDTL 15 MIN: Performed by: OBSTETRICS & GYNECOLOGY

## 2019-11-08 PROCEDURE — 25000128 H RX IP 250 OP 636: Performed by: NURSE ANESTHETIST, CERTIFIED REGISTERED

## 2019-11-08 PROCEDURE — 25000125 ZZHC RX 250: Performed by: NURSE ANESTHETIST, CERTIFIED REGISTERED

## 2019-11-08 PROCEDURE — 40000305 ZZH STATISTIC PRE PROC ASSESS I: Performed by: OBSTETRICS & GYNECOLOGY

## 2019-11-08 PROCEDURE — 36000050 ZZH SURGERY LEVEL 2 1ST 30 MIN: Performed by: OBSTETRICS & GYNECOLOGY

## 2019-11-08 PROCEDURE — 27110028 ZZH OR GENERAL SUPPLY NON-STERILE: Performed by: OBSTETRICS & GYNECOLOGY

## 2019-11-08 PROCEDURE — 25000132 ZZH RX MED GY IP 250 OP 250 PS 637: Performed by: OBSTETRICS & GYNECOLOGY

## 2019-11-08 PROCEDURE — 25000125 ZZHC RX 250: Performed by: OBSTETRICS & GYNECOLOGY

## 2019-11-08 PROCEDURE — 56440 MRSPLZATN BRTHLNS GLND CST: CPT | Performed by: OBSTETRICS & GYNECOLOGY

## 2019-11-08 PROCEDURE — 25000128 H RX IP 250 OP 636: Performed by: OBSTETRICS & GYNECOLOGY

## 2019-11-08 PROCEDURE — 25800030 ZZH RX IP 258 OP 636: Performed by: NURSE ANESTHETIST, CERTIFIED REGISTERED

## 2019-11-08 PROCEDURE — 81025 URINE PREGNANCY TEST: CPT | Performed by: OBSTETRICS & GYNECOLOGY

## 2019-11-08 PROCEDURE — 71000027 ZZH RECOVERY PHASE 2 EACH 15 MINS: Performed by: OBSTETRICS & GYNECOLOGY

## 2019-11-08 PROCEDURE — 27210794 ZZH OR GENERAL SUPPLY STERILE: Performed by: OBSTETRICS & GYNECOLOGY

## 2019-11-08 PROCEDURE — 36000052 ZZH SURGERY LEVEL 2 EA 15 ADDTL MIN: Performed by: OBSTETRICS & GYNECOLOGY

## 2019-11-08 RX ORDER — ACETAMINOPHEN 325 MG/1
975 TABLET ORAL ONCE
Status: COMPLETED | OUTPATIENT
Start: 2019-11-08 | End: 2019-11-08

## 2019-11-08 RX ORDER — LIDOCAINE 40 MG/G
CREAM TOPICAL
Status: DISCONTINUED | OUTPATIENT
Start: 2019-11-08 | End: 2019-11-08 | Stop reason: HOSPADM

## 2019-11-08 RX ORDER — SODIUM CHLORIDE 9 MG/ML
INJECTION, SOLUTION INTRAVENOUS CONTINUOUS
Status: DISCONTINUED | OUTPATIENT
Start: 2019-11-08 | End: 2019-11-08 | Stop reason: HOSPADM

## 2019-11-08 RX ORDER — ONDANSETRON 2 MG/ML
INJECTION INTRAMUSCULAR; INTRAVENOUS PRN
Status: DISCONTINUED | OUTPATIENT
Start: 2019-11-08 | End: 2019-11-08

## 2019-11-08 RX ORDER — KETOROLAC TROMETHAMINE 30 MG/ML
30 INJECTION, SOLUTION INTRAMUSCULAR; INTRAVENOUS ONCE
Status: COMPLETED | OUTPATIENT
Start: 2019-11-08 | End: 2019-11-08

## 2019-11-08 RX ORDER — LIDOCAINE HYDROCHLORIDE 20 MG/ML
INJECTION, SOLUTION INFILTRATION; PERINEURAL PRN
Status: DISCONTINUED | OUTPATIENT
Start: 2019-11-08 | End: 2019-11-08

## 2019-11-08 RX ORDER — CLINDAMYCIN PHOSPHATE 900 MG/50ML
900 INJECTION, SOLUTION INTRAVENOUS
Status: COMPLETED | OUTPATIENT
Start: 2019-11-08 | End: 2019-11-08

## 2019-11-08 RX ORDER — LIDOCAINE HYDROCHLORIDE 20 MG/ML
JELLY TOPICAL PRN
Qty: 30 ML | Refills: 0 | Status: SHIPPED | OUTPATIENT
Start: 2019-11-08

## 2019-11-08 RX ORDER — ONDANSETRON 4 MG/1
4 TABLET, ORALLY DISINTEGRATING ORAL
Status: CANCELLED | OUTPATIENT
Start: 2019-11-08

## 2019-11-08 RX ORDER — FENTANYL CITRATE 50 UG/ML
INJECTION, SOLUTION INTRAMUSCULAR; INTRAVENOUS PRN
Status: DISCONTINUED | OUTPATIENT
Start: 2019-11-08 | End: 2019-11-08

## 2019-11-08 RX ORDER — IBUPROFEN 800 MG/1
800 TABLET, FILM COATED ORAL EVERY 8 HOURS PRN
Qty: 30 TABLET | Refills: 1 | Status: SHIPPED | OUTPATIENT
Start: 2019-11-08

## 2019-11-08 RX ORDER — GLYCOPYRROLATE 0.2 MG/ML
INJECTION, SOLUTION INTRAMUSCULAR; INTRAVENOUS PRN
Status: DISCONTINUED | OUTPATIENT
Start: 2019-11-08 | End: 2019-11-08

## 2019-11-08 RX ORDER — PROPOFOL 10 MG/ML
INJECTION, EMULSION INTRAVENOUS PRN
Status: DISCONTINUED | OUTPATIENT
Start: 2019-11-08 | End: 2019-11-08

## 2019-11-08 RX ORDER — SODIUM CHLORIDE 9 MG/ML
INJECTION, SOLUTION INTRAVENOUS CONTINUOUS PRN
Status: DISCONTINUED | OUTPATIENT
Start: 2019-11-08 | End: 2019-11-08

## 2019-11-08 RX ADMIN — PROPOFOL 50 MG: 10 INJECTION, EMULSION INTRAVENOUS at 11:37

## 2019-11-08 RX ADMIN — PROPOFOL 10 MG: 10 INJECTION, EMULSION INTRAVENOUS at 11:54

## 2019-11-08 RX ADMIN — KETOROLAC TROMETHAMINE 30 MG: 30 INJECTION, SOLUTION INTRAMUSCULAR; INTRAVENOUS at 10:37

## 2019-11-08 RX ADMIN — ONDANSETRON 4 MG: 2 INJECTION INTRAMUSCULAR; INTRAVENOUS at 11:39

## 2019-11-08 RX ADMIN — PROPOFOL 10 MG: 10 INJECTION, EMULSION INTRAVENOUS at 11:46

## 2019-11-08 RX ADMIN — SODIUM CHLORIDE: 9 INJECTION, SOLUTION INTRAVENOUS at 10:27

## 2019-11-08 RX ADMIN — PROPOFOL 10 MG: 10 INJECTION, EMULSION INTRAVENOUS at 11:49

## 2019-11-08 RX ADMIN — PROPOFOL 50 MG: 10 INJECTION, EMULSION INTRAVENOUS at 11:34

## 2019-11-08 RX ADMIN — PROPOFOL 50 MG: 10 INJECTION, EMULSION INTRAVENOUS at 11:36

## 2019-11-08 RX ADMIN — LIDOCAINE HYDROCHLORIDE 40 MG: 20 INJECTION, SOLUTION INFILTRATION; PERINEURAL at 11:35

## 2019-11-08 RX ADMIN — MIDAZOLAM 2 MG: 1 INJECTION INTRAMUSCULAR; INTRAVENOUS at 11:31

## 2019-11-08 RX ADMIN — GLYCOPYRROLATE 0.2 MG: 0.2 INJECTION, SOLUTION INTRAMUSCULAR; INTRAVENOUS at 11:52

## 2019-11-08 RX ADMIN — CLINDAMYCIN IN 5 PERCENT DEXTROSE 900 MG: 18 INJECTION, SOLUTION INTRAVENOUS at 11:31

## 2019-11-08 RX ADMIN — ACETAMINOPHEN 975 MG: 325 TABLET, FILM COATED ORAL at 10:36

## 2019-11-08 RX ADMIN — FENTANYL CITRATE 50 MCG: 50 INJECTION, SOLUTION INTRAMUSCULAR; INTRAVENOUS at 11:34

## 2019-11-08 RX ADMIN — FENTANYL CITRATE 25 MCG: 50 INJECTION, SOLUTION INTRAMUSCULAR; INTRAVENOUS at 11:43

## 2019-11-08 RX ADMIN — SODIUM CHLORIDE 1000 ML: 9 INJECTION, SOLUTION INTRAVENOUS at 10:43

## 2019-11-08 ASSESSMENT — MIFFLIN-ST. JEOR: SCORE: 1259

## 2019-11-08 NOTE — OR NURSING
The patient is taking fluids and crackers without nausea or difficulty. Dr. Downey spoke with the patient and family at the bedside.

## 2019-11-08 NOTE — OR NURSING
Took juice and cookies w/o nausea.Up w/o vertigo.Patient and responsible adult given discharge instructions with no questions regarding instructions. Tyrone score 20. Pain level 0/10.  Discharged from unit via walking. Patient discharged to home.

## 2019-11-08 NOTE — OP NOTE
[facility]  Operative Note     Pre-operative diagnosis: Left Bartholin's Duct Cyst   Post-operative diagnosis Same, Pathology pending   Procedure: Left Bartholin's Duct Cyst Drainage and Marsupialization   Surgeon:  Assistant: Thiago Downey MD  None   Anesthesia: General                  COMPLICATIONS: None.   EBL:  Minimal  SPECIMENS:   None  OPERATIVE FINDINGS:  3cm left bartholin's duct cyst.  Non-purulent fluid drainage.    OPERATIVE DICTATION: The patient was brought to the operating room and uneventfully placed under general anesthesia. She was prepped and draped in the dorsal lithotomy position.  1 percent lidocaine with epineprhine was used to infiltrate the area.  A number 11 scapel was used to make a stab incion.  Straw colored non-purulent drainage was removed.  The cyst cavity was opened  and drained. Light monopolar cautery was used for hemostasis.  The mucosal edges of the cyst and vagina were sutured with interrupted 3.0 vicryl suture leaving an adequate drainage tract.  The wound was irrigated and checked for hemostasis.  Appropraite wound care dressing applied.     The instruments were removed. There were no complications and the patient was transferred to the recovery room in excellent stable condition.         Thiago Downey MD

## 2019-11-08 NOTE — DISCHARGE INSTRUCTIONS
No driving today or while on pain meds  Pelvic rest for 3 weeks  Schedule PO check Dr. Downey 3 weeks  Call Dr. Downey [phone] as necessary if problems in interim,  No heavy lifting, vigorous activity, exercise for 2 week      Post-Anesthesia Patient Instructions    IMMEDIATELY FOLLOWING SURGERY:  Do not drive or operate machinery for the first twenty four hours after surgery.  Do not make any important decisions for twenty four hours after surgery or while taking narcotic pain medications or sedatives.  If you develop intractable nausea and vomiting or a severe headache please notify your doctor immediately.    FOLLOW-UP:  Please make an appointment with your surgeon as instructed. You do not need to follow up with anesthesia unless specifically instructed to do so.    WOUND CARE INSTRUCTIONS (if applicable):  Keep a dry clean dressing on the anesthesia/puncture wound site if there is drainage.  Once the wound has quit draining you may leave it open to air.  Generally you should leave the bandage intact for twenty four hours unless there is drainage.  If the epidural site drains for more than 36-48 hours please call the anesthesia department.    QUESTIONS?:  Please feel free to call your physician or the hospital  if you have any questions, and they will be happy to assist you.

## 2019-11-08 NOTE — ANESTHESIA POSTPROCEDURE EVALUATION
Patient: Shy White    Procedure(s):  LEFT BARTHOLINS DUCT CYST DRAINAGE AND MARSUPILIZATION    Diagnosis:Bartholin's duct cyst [N75.0]  Diagnosis Additional Information: No value filed.    Anesthesia Type:  MAC    Note:  Anesthesia Post Evaluation    Patient location during evaluation: Phase 2  Patient participation: Able to fully participate in evaluation  Level of consciousness: awake and alert  Pain management: adequate  Airway patency: patent  Cardiovascular status: acceptable  Respiratory status: acceptable  Hydration status: stable  PONV: none     Anesthetic complications: None          Last vitals:  Vitals:    11/08/19 1220 11/08/19 1225 11/08/19 1230   BP: 100/70 106/88 (!) 84/64   Pulse: 66 73 87   Resp: 16 16 16   Temp:      SpO2: 98% 100% 100%         Electronically Signed By: ASHLI Webb CRNA  November 8, 2019  12:34 PM

## 2019-11-08 NOTE — ANESTHESIA CARE TRANSFER NOTE
Patient: Shy White    Procedure(s):  LEFT BARTHOLINS DUCT CYST DRAINAGE AND MARSUPILIZATION    Diagnosis: Bartholin's duct cyst [N75.0]  Diagnosis Additional Information: No value filed.    Anesthesia Type:   MAC     Note:  Airway :Nasal Cannula  Patient transferred to:Phase II  Handoff Report: Identifed the Patient, Identified the Reponsible Provider, Reviewed the pertinent medical history, Discussed the surgical course, Reviewed Intra-OP anesthesia mangement and issues during anesthesia, Set expectations for post-procedure period and Allowed opportunity for questions and acknowledgement of understanding      Vitals: (Last set prior to Anesthesia Care Transfer)    CRNA VITALS  11/8/2019 1130 - 11/8/2019 1207      11/8/2019             Resp Rate (set):  8                Electronically Signed By: ASHLI Rosales CRNA  November 8, 2019  12:07 PM

## 2020-03-02 ENCOUNTER — HEALTH MAINTENANCE LETTER (OUTPATIENT)
Age: 30
End: 2020-03-02

## 2020-10-12 ENCOUNTER — NURSE TRIAGE (OUTPATIENT)
Dept: FAMILY MEDICINE | Facility: OTHER | Age: 30
End: 2020-10-12

## 2020-10-12 DIAGNOSIS — Z20.822 COVID-19 RULED OUT: Primary | ICD-10-CM

## 2020-10-12 NOTE — TELEPHONE ENCOUNTER
Pt called, requesting covid testing. Reports headache, rash, nausea that started a week and a half ago. Denies fever, SOB. Scheduled for curbside testing. Advised to quarantine per recommendations from [facility]/CDC, symptomatic treatment, call back with change or worsening in symptoms. Pt verbalizes understanding.       Reason for Disposition    [1] COVID-19 infection suspected by caller or triager AND [2] mild symptoms (cough, fever, or others) AND [3] no complications or SOB    Additional Information    Negative: SEVERE difficulty breathing (e.g., struggling for each breath, speaks in single words)    Negative: Difficult to awaken or acting confused (e.g., disoriented, slurred speech)    Negative: Bluish (or gray) lips or face now    Negative: Shock suspected (e.g., cold/pale/clammy skin, too weak to stand, low BP, rapid pulse)    Negative: Sounds like a life-threatening emergency to the triager    Negative: [1] COVID-19 exposure AND [2] no symptoms    Negative: COVID-19 and Breastfeeding, questions about    Negative: [1] Adult with possible COVID-19 symptoms AND [2] triager concerned about severity of symptoms or other causes    Negative: SEVERE or constant chest pain or pressure (Exception: mild central chest pain, present only when coughing)    Negative: MODERATE difficulty breathing (e.g., speaks in phrases, SOB even at rest, pulse 100-120)    Negative: Patient sounds very sick or weak to the triager    Negative: MILD difficulty breathing (e.g., minimal/no SOB at rest, SOB with walking, pulse <100)    Negative: Chest pain or pressure    Negative: Fever > 103 F (39.4 C)    Negative: [1] Fever > 101 F (38.3 C) AND [2] age > 60    Negative: [1] Fever > 100.0 F (37.8 C) AND [2] bedridden (e.g., nursing home patient, CVA, chronic illness, recovering from surgery)    Negative: HIGH RISK patient (e.g., age > 64 years, diabetes, heart or lung disease, weak immune system) (Exception: Has already been evaluated by healthcare  "provider and has no new or worsening symptoms)    Negative: Fever present > 3 days (72 hours)    Negative: [1] Fever returns after gone for over 24 hours AND [2] symptoms worse or not improved    Negative: [1] Continuous (nonstop) coughing interferes with work or school AND [2] no improvement using cough treatment per protocol    Answer Assessment - Initial Assessment Questions  1. COVID-19 DIAGNOSIS: \"Who made your Coronavirus (COVID-19) diagnosis?\" \"Was it confirmed by a positive lab test?\" If not diagnosed by a HCP, ask \"Are there lots of cases (community spread) where you live?\" (See public health department website, if unsure)      Not confirmed  2. ONSET: \"When did the COVID-19 symptoms start?\"       A week and a half ago  3. WORST SYMPTOM: \"What is your worst symptom?\" (e.g., cough, fever, shortness of breath, muscle aches)      Headache   4. COUGH: \"Do you have a cough?\" If so, ask: \"How bad is the cough?\"        occasionally  5. FEVER: \"Do you have a fever?\" If so, ask: \"What is your temperature, how was it measured, and when did it start?\"      no  6. RESPIRATORY STATUS: \"Describe your breathing?\" (e.g., shortness of breath, wheezing, unable to speak)       no  7. BETTER-SAME-WORSE: \"Are you getting better, staying the same or getting worse compared to yesterday?\"  If getting worse, ask, \"In what way?\"      better  8. HIGH RISK DISEASE: \"Do you have any chronic medical problems?\" (e.g., asthma, heart or lung disease, weak immune system, etc.)      Heart problems  9. PREGNANCY: \"Is there any chance you are pregnant?\" \"When was your last menstrual period?\"      no  10. OTHER SYMPTOMS: \"Do you have any other symptoms?\"  (e.g., chills, fatigue, headache, loss of smell or taste, muscle pain, sore throat)        Headache, rash, nausea, diarrhea    Protocols used: CORONAVIRUS (COVID-19) DIAGNOSED OR [id number] 8.4.20      "

## 2020-10-13 ENCOUNTER — OFFICE VISIT (OUTPATIENT)
Dept: FAMILY MEDICINE | Facility: OTHER | Age: 30
End: 2020-10-13
Attending: NURSE PRACTITIONER
Payer: COMMERCIAL

## 2020-10-13 DIAGNOSIS — Z20.822 COVID-19 RULED OUT: ICD-10-CM

## 2020-10-13 PROCEDURE — U0003 INFECTIOUS AGENT DETECTION BY NUCLEIC ACID (DNA OR RNA); SEVERE ACUTE RESPIRATORY SYNDROME CORONAVIRUS 2 (SARS-COV-2) (CORONAVIRUS DISEASE [COVID-19]), AMPLIFIED PROBE TECHNIQUE, MAKING USE OF HIGH THROUGHPUT TECHNOLOGIES AS DESCRIBED BY CMS-2020-01-R: HCPCS | Performed by: NURSE PRACTITIONER

## 2020-10-14 LAB
SARS-COV-2 RNA SPEC QL NAA+PROBE: NOT DETECTED
SPECIMEN SOURCE: NORMAL

## 2020-12-14 ENCOUNTER — HEALTH MAINTENANCE LETTER (OUTPATIENT)
Age: 30
End: 2020-12-14

## 2021-02-16 ENCOUNTER — TRANSFERRED RECORDS (OUTPATIENT)
Dept: HEALTH INFORMATION MANAGEMENT | Facility: CLINIC | Age: 31
End: 2021-02-16

## 2021-04-18 ENCOUNTER — HEALTH MAINTENANCE LETTER (OUTPATIENT)
Age: 31
End: 2021-04-18

## 2021-09-27 ENCOUNTER — TRANSFERRED RECORDS (OUTPATIENT)
Dept: HEALTH INFORMATION MANAGEMENT | Facility: CLINIC | Age: 31
End: 2021-09-27

## 2021-10-02 ENCOUNTER — HEALTH MAINTENANCE LETTER (OUTPATIENT)
Age: 31
End: 2021-10-02

## 2022-05-14 ENCOUNTER — HEALTH MAINTENANCE LETTER (OUTPATIENT)
Age: 32
End: 2022-05-14

## 2022-05-25 ENCOUNTER — APPOINTMENT (OUTPATIENT)
Dept: CT IMAGING | Facility: HOSPITAL | Age: 32
End: 2022-05-25
Attending: STUDENT IN AN ORGANIZED HEALTH CARE EDUCATION/TRAINING PROGRAM
Payer: COMMERCIAL

## 2022-05-25 ENCOUNTER — APPOINTMENT (OUTPATIENT)
Dept: MRI IMAGING | Facility: HOSPITAL | Age: 32
End: 2022-05-25
Attending: STUDENT IN AN ORGANIZED HEALTH CARE EDUCATION/TRAINING PROGRAM
Payer: COMMERCIAL

## 2022-05-25 ENCOUNTER — HOSPITAL ENCOUNTER (EMERGENCY)
Facility: HOSPITAL | Age: 32
Discharge: HOME | End: 2022-05-25
Attending: STUDENT IN AN ORGANIZED HEALTH CARE EDUCATION/TRAINING PROGRAM | Admitting: STUDENT IN AN ORGANIZED HEALTH CARE EDUCATION/TRAINING PROGRAM
Payer: COMMERCIAL

## 2022-05-25 VITALS
WEIGHT: 123 LBS | TEMPERATURE: 98 F | RESPIRATION RATE: 16 BRPM | OXYGEN SATURATION: 98 % | DIASTOLIC BLOOD PRESSURE: 71 MMHG | SYSTOLIC BLOOD PRESSURE: 117 MMHG | BODY MASS INDEX: 21.11 KG/M2 | HEART RATE: 71 BPM

## 2022-05-25 DIAGNOSIS — M54.2 NECK PAIN: ICD-10-CM

## 2022-05-25 LAB
ANION GAP SERPL CALCULATED.3IONS-SCNC: 6 MMOL/L (ref 3–14)
BASOPHILS # BLD AUTO: 0 10E3/UL (ref 0–0.2)
BASOPHILS NFR BLD AUTO: 1 %
BUN SERPL-MCNC: 9 MG/DL (ref 7–30)
CALCIUM SERPL-MCNC: 8.8 MG/DL (ref 8.5–10.1)
CHLORIDE BLD-SCNC: 107 MMOL/L (ref 94–109)
CO2 SERPL-SCNC: 28 MMOL/L (ref 20–32)
CREAT SERPL-MCNC: 0.6 MG/DL (ref 0.52–1.04)
EOSINOPHIL # BLD AUTO: 0.1 10E3/UL (ref 0–0.7)
EOSINOPHIL NFR BLD AUTO: 3 %
ERYTHROCYTE [DISTWIDTH] IN BLOOD BY AUTOMATED COUNT: 13.2 % (ref 10–15)
GFR SERPL CREATININE-BSD FRML MDRD: >90 ML/MIN/1.73M2
GLUCOSE BLD-MCNC: 104 MG/DL (ref 70–99)
HCT VFR BLD AUTO: 40.1 % (ref 35–47)
HGB BLD-MCNC: 13.3 G/DL (ref 11.7–15.7)
HOLD SPECIMEN: NORMAL
IMM GRANULOCYTES # BLD: 0 10E3/UL
IMM GRANULOCYTES NFR BLD: 0 %
LYMPHOCYTES # BLD AUTO: 1.1 10E3/UL (ref 0.8–5.3)
LYMPHOCYTES NFR BLD AUTO: 23 %
MCH RBC QN AUTO: 28.7 PG (ref 26.5–33)
MCHC RBC AUTO-ENTMCNC: 33.2 G/DL (ref 31.5–36.5)
MCV RBC AUTO: 87 FL (ref 78–100)
MONOCYTES # BLD AUTO: 0.2 10E3/UL (ref 0–1.3)
MONOCYTES NFR BLD AUTO: 4 %
NEUTROPHILS # BLD AUTO: 3.1 10E3/UL (ref 1.6–8.3)
NEUTROPHILS NFR BLD AUTO: 69 %
NRBC # BLD AUTO: 0 10E3/UL
NRBC BLD AUTO-RTO: 0 /100
PLATELET # BLD AUTO: 282 10E3/UL (ref 150–450)
POTASSIUM BLD-SCNC: 3.4 MMOL/L (ref 3.4–5.3)
RBC # BLD AUTO: 4.63 10E6/UL (ref 3.8–5.2)
SODIUM SERPL-SCNC: 141 MMOL/L (ref 133–144)
WBC # BLD AUTO: 4.5 10E3/UL (ref 4–11)

## 2022-05-25 PROCEDURE — 99285 EMERGENCY DEPT VISIT HI MDM: CPT | Mod: 25

## 2022-05-25 PROCEDURE — 93010 ELECTROCARDIOGRAM REPORT: CPT | Performed by: INTERNAL MEDICINE

## 2022-05-25 PROCEDURE — 250N000011 HC RX IP 250 OP 636: Performed by: RADIOLOGY

## 2022-05-25 PROCEDURE — 255N000002 HC RX 255 OP 636: Performed by: RADIOLOGY

## 2022-05-25 PROCEDURE — 96375 TX/PRO/DX INJ NEW DRUG ADDON: CPT | Mod: XU

## 2022-05-25 PROCEDURE — 82310 ASSAY OF CALCIUM: CPT | Performed by: STUDENT IN AN ORGANIZED HEALTH CARE EDUCATION/TRAINING PROGRAM

## 2022-05-25 PROCEDURE — 70553 MRI BRAIN STEM W/O & W/DYE: CPT

## 2022-05-25 PROCEDURE — 70496 CT ANGIOGRAPHY HEAD: CPT

## 2022-05-25 PROCEDURE — 99285 EMERGENCY DEPT VISIT HI MDM: CPT | Performed by: STUDENT IN AN ORGANIZED HEALTH CARE EDUCATION/TRAINING PROGRAM

## 2022-05-25 PROCEDURE — 250N000013 HC RX MED GY IP 250 OP 250 PS 637: Performed by: STUDENT IN AN ORGANIZED HEALTH CARE EDUCATION/TRAINING PROGRAM

## 2022-05-25 PROCEDURE — 70450 CT HEAD/BRAIN W/O DYE: CPT

## 2022-05-25 PROCEDURE — A9585 GADOBUTROL INJECTION: HCPCS | Performed by: RADIOLOGY

## 2022-05-25 PROCEDURE — 85025 COMPLETE CBC W/AUTO DIFF WBC: CPT | Performed by: STUDENT IN AN ORGANIZED HEALTH CARE EDUCATION/TRAINING PROGRAM

## 2022-05-25 PROCEDURE — 96374 THER/PROPH/DIAG INJ IV PUSH: CPT | Mod: XU

## 2022-05-25 PROCEDURE — 36415 COLL VENOUS BLD VENIPUNCTURE: CPT | Performed by: STUDENT IN AN ORGANIZED HEALTH CARE EDUCATION/TRAINING PROGRAM

## 2022-05-25 PROCEDURE — 70498 CT ANGIOGRAPHY NECK: CPT

## 2022-05-25 PROCEDURE — 250N000011 HC RX IP 250 OP 636: Performed by: STUDENT IN AN ORGANIZED HEALTH CARE EDUCATION/TRAINING PROGRAM

## 2022-05-25 RX ORDER — ACETAMINOPHEN 325 MG/1
325-650 TABLET ORAL EVERY 6 HOURS PRN
COMMUNITY

## 2022-05-25 RX ORDER — CYCLOBENZAPRINE HCL 10 MG
10 TABLET ORAL 3 TIMES DAILY PRN
Qty: 20 TABLET | Refills: 0 | Status: SHIPPED | OUTPATIENT
Start: 2022-05-25 | End: 2022-05-31

## 2022-05-25 RX ORDER — OXYCODONE HYDROCHLORIDE 5 MG/1
5 TABLET ORAL
Status: DISCONTINUED | OUTPATIENT
Start: 2022-05-25 | End: 2022-05-25 | Stop reason: HOSPADM

## 2022-05-25 RX ORDER — KETOROLAC TROMETHAMINE 15 MG/ML
15 INJECTION, SOLUTION INTRAMUSCULAR; INTRAVENOUS ONCE
Status: COMPLETED | OUTPATIENT
Start: 2022-05-25 | End: 2022-05-25

## 2022-05-25 RX ORDER — LIDOCAINE 4 G/G
1 PATCH TOPICAL ONCE
Status: DISCONTINUED | OUTPATIENT
Start: 2022-05-25 | End: 2022-05-25 | Stop reason: HOSPADM

## 2022-05-25 RX ORDER — GADOBUTROL 604.72 MG/ML
2 INJECTION INTRAVENOUS ONCE
Status: COMPLETED | OUTPATIENT
Start: 2022-05-25 | End: 2022-05-25

## 2022-05-25 RX ORDER — CYCLOBENZAPRINE HCL 10 MG
10 TABLET ORAL ONCE
Status: COMPLETED | OUTPATIENT
Start: 2022-05-25 | End: 2022-05-25

## 2022-05-25 RX ORDER — ONDANSETRON 2 MG/ML
4 INJECTION INTRAMUSCULAR; INTRAVENOUS EVERY 30 MIN PRN
Status: DISCONTINUED | OUTPATIENT
Start: 2022-05-25 | End: 2022-05-25 | Stop reason: HOSPADM

## 2022-05-25 RX ORDER — ACETAMINOPHEN 325 MG/1
975 TABLET ORAL ONCE
Status: COMPLETED | OUTPATIENT
Start: 2022-05-25 | End: 2022-05-25

## 2022-05-25 RX ORDER — IOPAMIDOL 755 MG/ML
50 INJECTION, SOLUTION INTRAVASCULAR ONCE
Status: COMPLETED | OUTPATIENT
Start: 2022-05-25 | End: 2022-05-25

## 2022-05-25 RX ADMIN — Medication 1 PATCH: at 12:56

## 2022-05-25 RX ADMIN — ACETAMINOPHEN 975 MG: 325 TABLET ORAL at 12:56

## 2022-05-25 RX ADMIN — GADOBUTROL 2 ML: 604.72 INJECTION INTRAVENOUS at 12:02

## 2022-05-25 RX ADMIN — GADOBUTROL 2 ML: 604.72 INJECTION INTRAVENOUS at 12:01

## 2022-05-25 RX ADMIN — CYCLOBENZAPRINE HYDROCHLORIDE 10 MG: 10 TABLET, FILM COATED ORAL at 12:17

## 2022-05-25 RX ADMIN — ONDANSETRON 4 MG: 2 INJECTION INTRAMUSCULAR; INTRAVENOUS at 10:20

## 2022-05-25 RX ADMIN — IOPAMIDOL 50 ML: 755 INJECTION, SOLUTION INTRAVENOUS at 09:32

## 2022-05-25 RX ADMIN — KETOROLAC TROMETHAMINE 15 MG: 15 INJECTION, SOLUTION INTRAMUSCULAR; INTRAVENOUS at 12:55

## 2022-05-25 NOTE — DISCHARGE INSTRUCTIONS
Youshould follow-up with neurosurgery to be reevaluated.  You can expect a call from the neurosurgery team from [facility] to set up an appointment.  You can also call your primary care provider and ask to be seen.  Ideally 1 of these providers sees you within the next week.  Return to the emergency department for worsening symptoms or new concerning symptoms.      What to expect when you have contrast    During your exam, we will inject  contrast  into your vein or artery. (Contrast is a clear liquid with iodine in it. It shows up on X-rays.)    You may feel warm or hot. You may have a metal taste in your mouth and a slight upset stomach. You may also feel pressure near the kidneys and bladder. These effects will last about 1 to 3 minutes.    Please tell us if you have:   Sneezing    Itching   Hives    Swelling in the face   A hoarse voice   Breathing problems   Other new symptoms    Serious problems are rare.  They may include:   Irregular heartbeat    Seizures   Kidney failure             Tissue damage   Shock     Death    If you have any problems during the exam, we  will treat them right away.    When you get home    Call your hospital if you have any new symptoms in the next 2 days, like hives or swelling. (Phone numbers are at the bottom of this page.) Or call your family doctor.     If you have wheezing or trouble breathing, call 911.    Self-care  -Drink at least 4 extra glasses of water today.   This reduces the stress on your kidneys.  -Keep taking your regular medicines.    The contrast will pass out of your body in your  Urine(pee). This will happen in the next 24 hours. You  will not feel this. Your urine will not  change color.    If you have kidney problems or take metformin    Drink 4 to 8 large glasses of water for the next  2 days, if you are not on a fluid restriction.    ?If you take metformin (Glucophage or Glucovance) for diabetes, keep taking it.      ?Your kidney function tests are  abnormal.  If you take Metformin, do not take it for 48 hours. Please go to your clinic for a blood test within 3 days after your exam before the restarting this medicine.     (Note to provider:please give patient prescription for lab tests.)    ?Special instructions: -    I have read and understand the above information.    Patient Sign Here:______________________________________Date:________Time:______    Staff Sign Here:________________________________________Date:_______Time:______      Radiology Departments:     ?[facility]: [phone] ?[place]: [phone]     ?[place]: [phone] ?[facility]:[phone]      ?[place]: [phone]  ?[facility]: [phone]  ?[place]:[phone]    ?[facility]:[phone]  ?[facility] [place]:[phone]

## 2022-05-25 NOTE — ED TRIAGE NOTES
Patient presents with complaints of what seemed to be back pain a few months ago with some leg and back pain tingling. But states since last week when she's standing or sitting she's have severe neck pain and headaches.

## 2022-05-25 NOTE — ED PROVIDER NOTES
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  She states that the headaches and neck pain seem to show up spontaneously in the morning 1 day and have not left.  She states every time she sits up it gets substantially worse.  The pain seems to be fairly minimal to gone when she is lying flat but then anytime she sits up or stands up she has severe pain sometimes she feels like she is going to blackout from the degree of pain.  She states that it helps when she wears a c-collar to support her head.  She has no other complaints at this time.  She notes that she is never had this before, has not been having fevers, that there was no trauma associated with this.  She also denies recent lumbar punctures, chest pain, shortness of breath.  Denies any palpitations.  No other complaints at this time.    Allergies:  Allergies   Allergen Reactions     Azithromycin Swelling     Amoxicillin      Target lesions     Erythromycin GI Disturbance     target lesions     Keflex [Cephalexin Hcl]      Penicillins      Potassium Difficulty breathing     Sulfa Drugs      Prednisone Anxiety       Problem List:    Patient Active Problem List    Diagnosis Date Noted     History of Ninfa-Parkinson-White (WPW) syndrome 09/27/2019     Priority: Medium     History of cardiac radiofrequency ablation 09/27/2019     Priority: Medium     Severe episode of recurrent major depressive disorder (H) 09/27/2019     Priority: Medium     Anxiety 09/27/2019     Priority: Medium     PTSD (post-traumatic stress disorder) 09/27/2019     Priority: Medium     Atypical chest pain 09/27/2019     Priority: Medium     EDS (Praneeth-Danlos syndrome) 04/09/2018     Priority:  Medium     ACP (advance care planning) 05/11/2016     Priority: Medium     Advance Care Planning 5/11/2016: ACP Review of Chart / Resources Provided:  Reviewed chart for advance care plan.  Shy Catherine has no plan or code status on file. Discussed available resources and provided with information. Confirmed code status reflects current choices pending further ACP discussions.  Confirmed/documented legally designated decision makers.  Added by Cristina Burnett              Past Medical History:    Past Medical History:   Diagnosis Date     Anxiety 9/27/2019     Atypical chest pain 9/27/2019     Chronic pain      EDS (Praneeth-Danlos syndrome) 4/9/2018     History of cardiac radiofrequency ablation 9/27/2019     History of Ninfa-Parkinson-White (WPW) syndrome 9/27/2019     Interstitial cystitis      PTSD (post-traumatic stress disorder) 9/27/2019     Severe episode of recurrent major depressive disorder (H) 9/27/2019     Ninfa-Parkinson-White (WPW) syndrome 2/29/2016       Past Surgical History:    Past Surgical History:   Procedure Laterality Date     ABDOMEN SURGERY  2014    laproscopy, endometriosis     CARDIAC SURGERY  3-2016    ablation     CHOLECYSTECTOMY  04/2019     DILATION AND CURETTAGE  7/18/2013    Procedure: DILATION AND CURETTAGE;;  Surgeon: Latesha Camacho MD;  Location: [facility]     LAPAROSCOPY DIAGNOSTIC (GYN)  7/18/2013    Procedure: LAPAROSCOPY DIAGNOSTIC (GYN);  DIAGNOSTIC LAPAROSCOPY, Dilation & Curettage;  Surgeon: Latesha Camacho MD;  Location: [facility]     LAPAROSCOPY DIAGNOSTIC CHILD       MARSUPIALIZATION BARTHOLIN CYST Left 11/8/2019    Procedure: LEFT BARTHOLINS DUCT CYST DRAINAGE AND MARSUPILIZATION;  Surgeon: Thiago Downey MD;  Location: [facility]     ORTHOPEDIC SURGERY  12/17/15    hp surgery      ORTHOPEDIC SURGERY Right 11/08/2017    rt femur plate and screw removal     TONSILLECTOMY       TYMPANOPLASTY CHILD       wisdom teeth resection         Family History:    Family History   Problem  Relation Age of Onset     Crohn's Disease Mother      Arthritis Mother      Autoimmune Disease Mother      Coronary Artery Disease Father      Thyroid Disease Sister      Thyroid Disease Daughter        Social History:  Marital Status:   [2]  Social History     Tobacco Use     Smoking status: Former Smoker     Packs/day: 0.50     Types: Vaping Device     Quit date: 2016     Years since quittin.8     Smokeless tobacco: Never Used     Tobacco comment: 3-4 cigs per daydown to 2 a day   Substance Use Topics     Alcohol use: Not Currently     Comment: rare     Drug use: Yes     Types: Marijuana     Comment: medical cannabis        Medications:    acetaminophen (TYLENOL) 325 MG tablet  cyclobenzaprine (FLEXERIL) 10 MG tablet  ibuprofen (ADVIL/MOTRIN) 800 MG tablet  lidocaine (XYLOCAINE) 2 % external gel          Review of Systems  A complete review of systems was performed and is otherwise negative. ]    Physical Exam   BP: 144/89  Pulse: 82  Temp: 98  F (36.7  C)  Resp: 16  Weight: 55.8 kg (123 lb)  SpO2: 100 %      Physical Exam  Constitutional: Alert and conversant. NAD   HENT: NCAT   Eyes: Normal pupils   Neck: supple   CV: Normal rate, regular rhythm, no murmur   Pulmonary/Chest: Non-labored respirations, clear to auscultation bilaterally   Abdominal: Soft, non-tender, non-distended   MSK: EDMONDSON.   Neuro: Alert and appropriate, CN 2-12 intact, Strength 5/5 and symmetric in the upper and lower extremities, SILT, normal gait, normal rapid alternating movements  Skin: Warm and dry. No diaphoresis. No rashes on exposed skin    Psych: Appropriate mood and affect     ED Course              ED Course as of 22 1832   Wed May 25, 2022   0850 Immediately concerning differential given the history of Praneeth-Danlos include vertebral artery dissection and spontaneous CSF leak for which she is certainly at risk.  Labs and imaging, may require MRI   1203 CTA Head Neck with Contrast  CT head CTA head and neck all  look good, no evidence of intracranial bleed, no evidence of arterial dissection.  This is reassuring.     1203 Temp: 98  F (36.7  C)  Doubt meningitis   1237 Discussed case with the radiologist Dr. Cabrera who states the appropriate imaging modality for evaluating for CSF leak at this time is MR brain with contrast.  I have ordered this.  I did ask whether he thought spinal imaging was warranted, he said no.   1237 MR Brain w/o & w Contrast  No findings of CSF leak.   1237 We will treat with Toradol, Flexeril, acetaminophen, lidocaine patch, will offer oxycodone although she turned it down earlier look for clinical improvement.  If no evidence of clinical improvement, will discuss with a specialist   1238 EKG normal, no signs of acute ischemia or arrhythmia, no delta wave despite history of WPW   1340 Neurosurgery [facility]: agrees with work up and outpatient. Thinks NSGY follow up is reasonable.    1831 Patient is requesting to go, this seems reasonable, I talked to her about her risk, her Praneeth-Danlos, the current findings, the possibility of a missed acute CSF leak.  I also discussed this with neurosurgery from [facility].  There is no further evaluation at this point that we need to do and neurosurgery agrees with this.  Patient would like to follow-up with neurosurgery and I think this is reasonable.  Patient discharged in stable condition all questions answered return precautions given.  It certainly possible that she is simply having cervicalgia and will recommend treatments at home for that including Flexeril ibuprofen and Tylenol.      Procedures              Results for orders placed or performed during the hospital encounter of 05/25/22 (from the past 24 hour(s))   [place] Draw    Narrative    The following orders were created for panel order [place] Draw.  Procedure                               Abnormality         Status                     ---------                               -----------          ------                     Extra Blue Top Tube[[id number]]                              Final result               Extra Red Top Tube[[id number]]                               Final result               Extra Green Top (Lithium...[[id number]]                      Final result               Extra Purple Top Tube[[id number]]                            Final result               Extra Green Top (Lithium...[[id number]]                      Final result                 Please view results for these tests on the individual orders.   Extra Blue Top Tube   Result Value Ref [place]    Hold Specimen     Extra Red Top Tube   Result Value Ref [place]    Hold Specimen JIC    Extra Green Top (Lithium Heparin) Tube   Result Value Ref [place]    Hold Specimen JIC    Extra Purple Top Tube   Result Value Ref [place]    Hold Specimen     Extra Green Top (Lithium Heparin) ON ICE   Result Value Ref [place]    Hold Specimen JIC    CBC with platelets differential    Narrative    The following orders were created for panel order CBC with platelets differential.  Procedure                               Abnormality         Status                     ---------                               -----------         ------                     CBC with platelets and d...[[id number]]                      Final result                 Please view results for these tests on the individual orders.   Basic metabolic panel   Result Value Ref [place]    Sodium 141 133 - 144 mmol/L    Potassium 3.4 3.4 - 5.3 mmol/L    Chloride 107 94 - 109 mmol/L    Carbon Dioxide (CO2) 28 20 - 32 mmol/L    Anion Gap 6 3 - 14 mmol/L    Urea Nitrogen 9 7 - 30 mg/dL    Creatinine 0.60 0.52 - 1.04 mg/dL    Calcium 8.8 8.5 - 10.1 mg/dL    Glucose 104 (H) 70 - 99 mg/dL    GFR Estimate >90 >60 mL/min/1.73m2   CBC with platelets and differential   Result Value Ref [place]    WBC Count 4.5 4.0 - 11.0 10e3/uL    RBC Count 4.63 3.80 - 5.20 10e6/uL    Hemoglobin 13.3 11.7 - 15.7 g/dL    Hematocrit 40.1  35.0 - 47.0 %    MCV 87 78 - 100 fL    MCH 28.7 26.5 - 33.0 pg    MCHC 33.2 31.5 - 36.5 g/dL    RDW 13.2 10.0 - 15.0 %    Platelet Count 282 150 - 450 10e3/uL    % Neutrophils 69 %    % Lymphocytes 23 %    % Monocytes 4 %    % Eosinophils 3 %    % Basophils 1 %    % Immature Granulocytes 0 %    NRBCs per 100 WBC 0 <1 /100    Absolute Neutrophils 3.1 1.6 - 8.3 10e3/uL    Absolute Lymphocytes 1.1 0.8 - 5.3 10e3/uL    Absolute Monocytes 0.2 0.0 - 1.3 10e3/uL    Absolute Eosinophils 0.1 0.0 - 0.7 10e3/uL    Absolute Basophils 0.0 0.0 - 0.2 10e3/uL    Absolute Immature Granulocytes 0.0 <=0.4 10e3/uL    Absolute NRBCs 0.0 10e3/uL   CT Head w/o Contrast    Narrative    PROCEDURE: CT HEAD W/O CONTRAST     HISTORY: Headache, intracranial hemorrhage suspected.    COMPARISON: None.    TECHNIQUE:  Helical images of the head from the foramen magnum to the  vertex were obtained without contrast.    FINDINGS: The ventricles and sulci are normal in volume. No acute  intracranial hemorrhage, mass effect, midline shift, hydrocephalus or  basilar cystern effacement are present.    The grey-white matter interface is preserved.    The calvarium is intact. The mastoid air cells are clear.  The  visualized paranasal sinuses are clear.      Impression    IMPRESSION: Normal brain      JANA HOWARD MD         SYSTEM ID:  [id number]   CTA Head Neck with Contrast    Narrative    PROCEDURE: CTA  HEAD NECK WITH CONTRAST 5/25/2022 9:34 AM    HISTORY: Headache and neck pain    COMPARISONS: None.    Meds/Dose Given: ISOVUE 370  50mL    TECHNIQUE: CT angiogram of the neck and CT angiogram of the brain with  sagittal and coronal MIPS reconstructions    FINDINGS: No dissection is seen in the transverse arch of the aorta.    The brachiocephalic artery is widely patent. The right subclavian and  right vertebral arteries are normal. The right common carotid right  carotid bifurcation and right internal carotid arteries are normal.  The left common  carotid left carotid bifurcation and left internal  carotid arteries are normal. The left subclavian and left vertebral  arteries are normal.    CT angiogram of the brain: The distal vertebral arteries are normal.  The basilar artery is normal. The superior cerebellar arteries are  normal. The left posterior cerebral artery is filled predominantly via  the posterior communicating artery. The right posterior cerebral  artery is filled predominantly via the basilar artery. The carotid  siphons are normal. The supraclinoid internal carotid arteries are  normal. The anterior and middle cerebral arteries are normal  bilaterally. No intracranial aneurysms are seen.    The muscles of mastication and the parapharyngeal spaces are normal.  The larynx and upper trachea is normal. The thyroid gland is normal.  Cervical and supraclavicular lymph nodes appear normal. The upper  mediastinal lymph nodes are normal. The lung apices are clear.         Impression    IMPRESSION: No hemodynamically significant stenoses or aneurysms are  seen in the arteries of the neck and brain    JANA HOWARD MD         SYSTEM ID:  [id number]   MR Brain w/o & w Contrast    Narrative    EXAM:  MR BRAIN W/O & W CONTRAST    HISTORY:  Headaches, possibly positional. .    TECHNIQUE:  Sagittal T1, axial T1, T2, FLAIR, diffusion, gradient as  well as axial and 3D postcontrast imaging of the whole brain was  performed.    MEDS/CONTRAST: Gadavist  5.5   mL    COMPARISON:  CT 5/25/2022.     FINDINGS:    The ventricles and sulci are normal. No abnormal extra-axial  collection, hydrocephalus, mass effect or midline shift is seen. The  basal cisterns are preserved.    The parenchymal signal is within normal limits. No abnormal  intracranial enhancement or concerning T2* gradient susceptibility is  identified. No restricted diffusion is present. The major intracranial  vascular flow voids are preserved. No pachymeningeal thickening or  enhancement is seen. No  subdural hygroma is identified.    The T1 marrow signal is unremarkable. The orbits are intact. Trace  paranasal sinus mucosal thickening is seen.      Impression    IMPRESSION: Negative MR brain.    SHANTE JAVED MD         SYSTEM ID:  [id number]       Medications   sodium chloride (PF) 0.9% PF flush 100 mL (50 mLs Intravenous Given 5/25/22 0933)   iopamidol (ISOVUE-370) solution 50 mL (50 mLs Intravenous Given 5/25/22 0932)   cyclobenzaprine (FLEXERIL) tablet 10 mg (10 mg Oral Given 5/25/22 1217)   gadobutrol (GADAVIST) injection 2 mL (2 mLs Intravenous Given 5/25/22 1201)   gadobutrol (GADAVIST) injection 2 mL (2 mLs Intravenous Given 5/25/22 1202)   gadobutrol (GADAVIST) injection 2 mL (2 mLs Intravenous Given 5/25/22 1201)   ketorolac (TORADOL) injection 15 mg (15 mg Intravenous Given 5/25/22 1255)   acetaminophen (TYLENOL) tablet 975 mg (975 mg Oral Given 5/25/22 1256)       Assessments & Plan (with Medical Decision Making)     I have reviewed the nursing notes.    I have reviewed the findings, diagnosis, plan and need for follow up with the patient.      Discharge Medication List as of 5/25/2022  1:53 PM          Final diagnoses:   Neck pain       5/25/2022   [facility]     Ish May MD  05/25/22 [id number]

## 2022-07-19 ENCOUNTER — TELEPHONE (OUTPATIENT)
Dept: FAMILY MEDICINE | Facility: OTHER | Age: 32
End: 2022-07-19

## 2022-07-19 NOTE — TELEPHONE ENCOUNTER
Spoke with pt and offered telephone visit. She declined and stated she will call back if she is able to take it as she has ins issues

## 2022-07-19 NOTE — TELEPHONE ENCOUNTER
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 Advised pt that we may need to schedule an appt as she has not been seen by [facility] in almost 3 years. She stated she was referred elsewhere to continue her care for the EDS but has not been back there for awhile. Fax number for [facility] [employer] is [phone]/phone number is [phone]    Was an appointment offered for this call? Advised pt we may need appt but she asked to send message back first  If yes : Appointment type              Date    Preferred method for responding to this message: Telephone Call  What is your phone number ?    If we cannot reach you directly, may we leave a detailed response at the number you provided? Yes    Can this message wait until your PCP/provider returns, if available today? Not applicable    Promise Resendez

## 2022-09-03 ENCOUNTER — HEALTH MAINTENANCE LETTER (OUTPATIENT)
Age: 32
End: 2022-09-03

## 2023-06-03 ENCOUNTER — HEALTH MAINTENANCE LETTER (OUTPATIENT)
Age: 33
End: 2023-06-03

## 2024-07-06 ENCOUNTER — HEALTH MAINTENANCE LETTER (OUTPATIENT)
Age: 34
End: 2024-07-06

## (undated) DEVICE — CAUTERY PAD-POLYHESIVE II ADULT

## (undated) DEVICE — CAUTERY PENCIL-SMOKE EVACUATION

## (undated) DEVICE — SYRINGE-ASEPTO IRRIGATION

## (undated) DEVICE — PACK-GYN CYSTO-CUSTOM

## (undated) DEVICE — GLV-8.5 BIOGEL LATEX

## (undated) DEVICE — PANTIES-MESH L/XL

## (undated) DEVICE — BLADE-SCALPEL #15

## (undated) DEVICE — CANISTER-SUCTION 2000CC

## (undated) DEVICE — DRSG-NON ADHERING 3 X 8 TELFA

## (undated) DEVICE — TRAY-SKIN PREP POVIDONE/IODINE

## (undated) DEVICE — NDL-25G 1-1/2" NON-SAFETY

## (undated) DEVICE — SUTURE-VICRYL 3-0 CT-2 J232H

## (undated) DEVICE — IRRIGATION-NACL 1000ML

## (undated) DEVICE — DRSG-SPONGE X-RAY 4 X 4

## (undated) DEVICE — LABEL-STERILE PREPRINTED FOR OR

## (undated) DEVICE — CATH-URETHRAL 14FR

## (undated) DEVICE — SCD SLEEVE-KNEE REG.

## (undated) DEVICE — CATH-FOLEY-2 WAY 14FR-5CC

## (undated) DEVICE — IRRIGATION-H2O 1000ML

## (undated) DEVICE — TUBING-SUCTION 20FT

## (undated) DEVICE — SUCTION TUBE-YANKAUR

## (undated) DEVICE — LIGHT HANDLE COVER

## (undated) DEVICE — SPONGE-LAPAROTOMY PADS 18 X 18

## (undated) DEVICE — CAUTERY-MEGADYNE TIP

## (undated) DEVICE — SANITARY NAPKINS-SINGLE

## (undated) RX ORDER — FENTANYL CITRATE 50 UG/ML
INJECTION, SOLUTION INTRAMUSCULAR; INTRAVENOUS
Status: DISPENSED
Start: 2019-11-08

## (undated) RX ORDER — LIDOCAINE HYDROCHLORIDE 20 MG/ML
INJECTION, SOLUTION EPIDURAL; INFILTRATION; INTRACAUDAL; PERINEURAL
Status: DISPENSED
Start: 2019-11-08

## (undated) RX ORDER — ONDANSETRON 2 MG/ML
INJECTION INTRAMUSCULAR; INTRAVENOUS
Status: DISPENSED
Start: 2019-11-08

## (undated) RX ORDER — GLYCOPYRROLATE 0.2 MG/ML
INJECTION, SOLUTION INTRAMUSCULAR; INTRAVENOUS
Status: DISPENSED
Start: 2019-11-08

## (undated) RX ORDER — PROPOFOL 10 MG/ML
INJECTION, EMULSION INTRAVENOUS
Status: DISPENSED
Start: 2019-11-08